# Patient Record
Sex: MALE | Race: WHITE | NOT HISPANIC OR LATINO
[De-identification: names, ages, dates, MRNs, and addresses within clinical notes are randomized per-mention and may not be internally consistent; named-entity substitution may affect disease eponyms.]

---

## 2017-12-01 ENCOUNTER — TRANSCRIPTION ENCOUNTER (OUTPATIENT)
Age: 37
End: 2017-12-01

## 2018-08-14 ENCOUNTER — INPATIENT (INPATIENT)
Facility: HOSPITAL | Age: 38
LOS: 2 days | Discharge: HOME | DRG: 76 | End: 2018-08-17
Attending: INTERNAL MEDICINE | Admitting: INTERNAL MEDICINE
Payer: MEDICAID

## 2018-08-14 VITALS
OXYGEN SATURATION: 100 % | SYSTOLIC BLOOD PRESSURE: 108 MMHG | HEIGHT: 68 IN | TEMPERATURE: 98 F | DIASTOLIC BLOOD PRESSURE: 54 MMHG | HEART RATE: 83 BPM | WEIGHT: 195.11 LBS | RESPIRATION RATE: 16 BRPM

## 2018-08-14 DIAGNOSIS — G03.9 MENINGITIS, UNSPECIFIED: ICD-10-CM

## 2018-08-14 DIAGNOSIS — Z29.9 ENCOUNTER FOR PROPHYLACTIC MEASURES, UNSPECIFIED: ICD-10-CM

## 2018-08-14 DIAGNOSIS — Z90.49 ACQUIRED ABSENCE OF OTHER SPECIFIED PARTS OF DIGESTIVE TRACT: Chronic | ICD-10-CM

## 2018-08-14 LAB
ALBUMIN SERPL ELPH-MCNC: 4.7 G/DL — SIGNIFICANT CHANGE UP (ref 3.3–5)
ALP SERPL-CCNC: 50 U/L — SIGNIFICANT CHANGE UP (ref 40–120)
ALT FLD-CCNC: 50 U/L — HIGH (ref 10–45)
ANION GAP SERPL CALC-SCNC: 18 MMOL/L — HIGH (ref 5–17)
APPEARANCE CSF: CLEAR — SIGNIFICANT CHANGE UP
APPEARANCE SPUN FLD: COLORLESS — SIGNIFICANT CHANGE UP
APPEARANCE UR: CLEAR — SIGNIFICANT CHANGE UP
AST SERPL-CCNC: 27 U/L — SIGNIFICANT CHANGE UP (ref 10–40)
BACTERIA # UR AUTO: ABNORMAL /HPF
BASOPHILS # BLD AUTO: 0 K/UL — SIGNIFICANT CHANGE UP (ref 0–0.2)
BASOPHILS NFR BLD AUTO: 0 % — SIGNIFICANT CHANGE UP (ref 0–2)
BILIRUB SERPL-MCNC: 1 MG/DL — SIGNIFICANT CHANGE UP (ref 0.2–1.2)
BILIRUB UR-MCNC: NEGATIVE — SIGNIFICANT CHANGE UP
BUN SERPL-MCNC: 9 MG/DL — SIGNIFICANT CHANGE UP (ref 7–23)
CALCIUM SERPL-MCNC: 10 MG/DL — SIGNIFICANT CHANGE UP (ref 8.4–10.5)
CHLORIDE SERPL-SCNC: 100 MMOL/L — SIGNIFICANT CHANGE UP (ref 96–108)
CO2 SERPL-SCNC: 21 MMOL/L — LOW (ref 22–31)
COLOR CSF: SIGNIFICANT CHANGE UP
COLOR SPEC: YELLOW — SIGNIFICANT CHANGE UP
CREAT SERPL-MCNC: 1.2 MG/DL — SIGNIFICANT CHANGE UP (ref 0.5–1.3)
CSF PCR RESULT: DETECTED
DIFF PNL FLD: NEGATIVE — SIGNIFICANT CHANGE UP
EOSINOPHIL # BLD AUTO: 0 K/UL — SIGNIFICANT CHANGE UP (ref 0–0.5)
EOSINOPHIL # CSF: 1 % — SIGNIFICANT CHANGE UP
EOSINOPHIL NFR BLD AUTO: 0.3 % — SIGNIFICANT CHANGE UP (ref 0–6)
GLUCOSE CSF-MCNC: 60 MG/DL — SIGNIFICANT CHANGE UP (ref 40–70)
GLUCOSE SERPL-MCNC: 107 MG/DL — HIGH (ref 70–99)
GLUCOSE UR QL: NEGATIVE — SIGNIFICANT CHANGE UP
GRAM STN FLD: SIGNIFICANT CHANGE UP
HCT VFR BLD CALC: 43.7 % — SIGNIFICANT CHANGE UP (ref 39–50)
HGB BLD-MCNC: 14.9 G/DL — SIGNIFICANT CHANGE UP (ref 13–17)
KETONES UR-MCNC: ABNORMAL
LEUKOCYTE ESTERASE UR-ACNC: NEGATIVE — SIGNIFICANT CHANGE UP
LYMPHOCYTES # BLD AUTO: 1.4 K/UL — SIGNIFICANT CHANGE UP (ref 1–3.3)
LYMPHOCYTES # BLD AUTO: 12 % — LOW (ref 13–44)
LYMPHOCYTES # CSF: 81 % — HIGH (ref 40–80)
MCHC RBC-ENTMCNC: 29.2 PG — SIGNIFICANT CHANGE UP (ref 27–34)
MCHC RBC-ENTMCNC: 34.1 GM/DL — SIGNIFICANT CHANGE UP (ref 32–36)
MCV RBC AUTO: 85.6 FL — SIGNIFICANT CHANGE UP (ref 80–100)
MONOCYTES # BLD AUTO: 0.5 K/UL — SIGNIFICANT CHANGE UP (ref 0–0.9)
MONOCYTES NFR BLD AUTO: 4.4 % — SIGNIFICANT CHANGE UP (ref 2–14)
MONOS+MACROS NFR CSF: 16 % — SIGNIFICANT CHANGE UP (ref 15–45)
NEUTROPHILS # BLD AUTO: 9.4 K/UL — HIGH (ref 1.8–7.4)
NEUTROPHILS # CSF: 2 % — SIGNIFICANT CHANGE UP (ref 0–6)
NEUTROPHILS NFR BLD AUTO: 83.3 % — HIGH (ref 43–77)
NITRITE UR-MCNC: NEGATIVE — SIGNIFICANT CHANGE UP
NRBC NFR CSF: 300 /UL — HIGH (ref 0–5)
PH UR: 7.5 — SIGNIFICANT CHANGE UP (ref 5–8)
PLATELET # BLD AUTO: 255 K/UL — SIGNIFICANT CHANGE UP (ref 150–400)
POTASSIUM SERPL-MCNC: 3.9 MMOL/L — SIGNIFICANT CHANGE UP (ref 3.5–5.3)
POTASSIUM SERPL-SCNC: 3.9 MMOL/L — SIGNIFICANT CHANGE UP (ref 3.5–5.3)
PROT CSF-MCNC: 92 MG/DL — HIGH (ref 15–45)
PROT SERPL-MCNC: 8.3 G/DL — SIGNIFICANT CHANGE UP (ref 6–8.3)
PROT UR-MCNC: SIGNIFICANT CHANGE UP
RBC # BLD: 5.11 M/UL — SIGNIFICANT CHANGE UP (ref 4.2–5.8)
RBC # CSF: 37 /UL — HIGH (ref 0–0)
RBC # FLD: 12.4 % — SIGNIFICANT CHANGE UP (ref 10.3–14.5)
RBC CASTS # UR COMP ASSIST: SIGNIFICANT CHANGE UP /HPF (ref 0–2)
SODIUM SERPL-SCNC: 139 MMOL/L — SIGNIFICANT CHANGE UP (ref 135–145)
SP GR SPEC: 1.01 — SIGNIFICANT CHANGE UP (ref 1.01–1.02)
SPECIMEN SOURCE: SIGNIFICANT CHANGE UP
TUBE TYPE: SIGNIFICANT CHANGE UP
UROBILINOGEN FLD QL: NEGATIVE — SIGNIFICANT CHANGE UP
VZV DNA CSF QL NAA+PROBE: DETECTED
WBC # BLD: 11.3 K/UL — HIGH (ref 3.8–10.5)
WBC # FLD AUTO: 11.3 K/UL — HIGH (ref 3.8–10.5)
WBC UR QL: SIGNIFICANT CHANGE UP /HPF (ref 0–5)

## 2018-08-14 PROCEDURE — 93010 ELECTROCARDIOGRAM REPORT: CPT

## 2018-08-14 PROCEDURE — 99232 SBSQ HOSP IP/OBS MODERATE 35: CPT

## 2018-08-14 PROCEDURE — 70450 CT HEAD/BRAIN W/O DYE: CPT | Mod: 26

## 2018-08-14 PROCEDURE — 99285 EMERGENCY DEPT VISIT HI MDM: CPT | Mod: 25

## 2018-08-14 PROCEDURE — 99223 1ST HOSP IP/OBS HIGH 75: CPT

## 2018-08-14 PROCEDURE — 62270 DX LMBR SPI PNXR: CPT

## 2018-08-14 RX ORDER — ONDANSETRON 8 MG/1
4 TABLET, FILM COATED ORAL ONCE
Qty: 0 | Refills: 0 | Status: COMPLETED | OUTPATIENT
Start: 2018-08-14 | End: 2018-08-14

## 2018-08-14 RX ORDER — CEFTRIAXONE 500 MG/1
2 INJECTION, POWDER, FOR SOLUTION INTRAMUSCULAR; INTRAVENOUS EVERY 12 HOURS
Qty: 0 | Refills: 0 | Status: DISCONTINUED | OUTPATIENT
Start: 2018-08-14 | End: 2018-08-15

## 2018-08-14 RX ORDER — ACYCLOVIR SODIUM 500 MG
VIAL (EA) INTRAVENOUS
Qty: 0 | Refills: 0 | Status: DISCONTINUED | OUTPATIENT
Start: 2018-08-14 | End: 2018-08-17

## 2018-08-14 RX ORDER — ACYCLOVIR SODIUM 500 MG
900 VIAL (EA) INTRAVENOUS EVERY 8 HOURS
Qty: 0 | Refills: 0 | Status: DISCONTINUED | OUTPATIENT
Start: 2018-08-14 | End: 2018-08-15

## 2018-08-14 RX ORDER — DIPHENHYDRAMINE HCL 50 MG
50 CAPSULE ORAL ONCE
Qty: 0 | Refills: 0 | Status: COMPLETED | OUTPATIENT
Start: 2018-08-14 | End: 2018-08-14

## 2018-08-14 RX ORDER — ACYCLOVIR SODIUM 500 MG
900 VIAL (EA) INTRAVENOUS EVERY 8 HOURS
Qty: 0 | Refills: 0 | Status: DISCONTINUED | OUTPATIENT
Start: 2018-08-15 | End: 2018-08-17

## 2018-08-14 RX ORDER — ONDANSETRON 8 MG/1
4 TABLET, FILM COATED ORAL EVERY 6 HOURS
Qty: 0 | Refills: 0 | Status: DISCONTINUED | OUTPATIENT
Start: 2018-08-14 | End: 2018-08-17

## 2018-08-14 RX ORDER — CETIRIZINE HYDROCHLORIDE 10 MG/1
0 TABLET ORAL
Qty: 0 | Refills: 0 | COMMUNITY

## 2018-08-14 RX ORDER — VANCOMYCIN HCL 1 G
1000 VIAL (EA) INTRAVENOUS ONCE
Qty: 0 | Refills: 0 | Status: COMPLETED | OUTPATIENT
Start: 2018-08-14 | End: 2018-08-14

## 2018-08-14 RX ORDER — SODIUM CHLORIDE 9 MG/ML
1000 INJECTION INTRAMUSCULAR; INTRAVENOUS; SUBCUTANEOUS
Qty: 0 | Refills: 0 | Status: DISCONTINUED | OUTPATIENT
Start: 2018-08-14 | End: 2018-08-17

## 2018-08-14 RX ORDER — METOCLOPRAMIDE HCL 10 MG
10 TABLET ORAL ONCE
Qty: 0 | Refills: 0 | Status: COMPLETED | OUTPATIENT
Start: 2018-08-14 | End: 2018-08-14

## 2018-08-14 RX ORDER — CEFTRIAXONE 500 MG/1
2 INJECTION, POWDER, FOR SOLUTION INTRAMUSCULAR; INTRAVENOUS ONCE
Qty: 0 | Refills: 0 | Status: COMPLETED | OUTPATIENT
Start: 2018-08-14 | End: 2018-08-14

## 2018-08-14 RX ORDER — VANCOMYCIN HCL 1 G
1250 VIAL (EA) INTRAVENOUS EVERY 12 HOURS
Qty: 0 | Refills: 0 | Status: DISCONTINUED | OUTPATIENT
Start: 2018-08-14 | End: 2018-08-15

## 2018-08-14 RX ORDER — SODIUM CHLORIDE 9 MG/ML
1000 INJECTION INTRAMUSCULAR; INTRAVENOUS; SUBCUTANEOUS ONCE
Qty: 0 | Refills: 0 | Status: COMPLETED | OUTPATIENT
Start: 2018-08-14 | End: 2018-08-14

## 2018-08-14 RX ORDER — CEFTRIAXONE 500 MG/1
2000 INJECTION, POWDER, FOR SOLUTION INTRAMUSCULAR; INTRAVENOUS EVERY 12 HOURS
Qty: 0 | Refills: 0 | Status: DISCONTINUED | OUTPATIENT
Start: 2018-08-14 | End: 2018-08-14

## 2018-08-14 RX ORDER — ACETAMINOPHEN 500 MG
975 TABLET ORAL ONCE
Qty: 0 | Refills: 0 | Status: COMPLETED | OUTPATIENT
Start: 2018-08-14 | End: 2018-08-14

## 2018-08-14 RX ORDER — ACYCLOVIR SODIUM 500 MG
900 VIAL (EA) INTRAVENOUS ONCE
Qty: 0 | Refills: 0 | Status: COMPLETED | OUTPATIENT
Start: 2018-08-14 | End: 2018-08-14

## 2018-08-14 RX ADMIN — Medication 50 MILLIGRAM(S): at 15:06

## 2018-08-14 RX ADMIN — Medication 10 MILLIGRAM(S): at 15:06

## 2018-08-14 RX ADMIN — Medication 975 MILLIGRAM(S): at 15:06

## 2018-08-14 RX ADMIN — CEFTRIAXONE 2 GRAM(S): 500 INJECTION, POWDER, FOR SOLUTION INTRAMUSCULAR; INTRAVENOUS at 16:54

## 2018-08-14 RX ADMIN — Medication 168 MILLIGRAM(S): at 18:07

## 2018-08-14 RX ADMIN — ONDANSETRON 4 MILLIGRAM(S): 8 TABLET, FILM COATED ORAL at 13:51

## 2018-08-14 RX ADMIN — SODIUM CHLORIDE 1000 MILLILITER(S): 9 INJECTION INTRAMUSCULAR; INTRAVENOUS; SUBCUTANEOUS at 14:30

## 2018-08-14 RX ADMIN — Medication 1000 MILLIGRAM(S): at 18:00

## 2018-08-14 RX ADMIN — SODIUM CHLORIDE 3000 MILLILITER(S): 9 INJECTION INTRAMUSCULAR; INTRAVENOUS; SUBCUTANEOUS at 14:07

## 2018-08-14 RX ADMIN — Medication 250 MILLIGRAM(S): at 16:59

## 2018-08-14 RX ADMIN — CEFTRIAXONE 100 GRAM(S): 500 INJECTION, POWDER, FOR SOLUTION INTRAMUSCULAR; INTRAVENOUS at 16:21

## 2018-08-14 NOTE — ED PROVIDER NOTE - NS ED ROS FT
AS PER HPI, otherwise:  Constitutional: see HPI  Eyes: no conjunctivitis, no vision changes  Ears: no ear pain   Nose: no nasal congestion, Mouth/Throat: no throat pain, Neck: (+) stiffness  Cardiovascular: no chest pain, no palpitations  Chest: no cough, no shortness of breath  Gastrointestinal: see HPI  MSK: see hpi  : see hpi  Skin: no rash  Neuro: no LOC, no focal weakness, no sensory changes

## 2018-08-14 NOTE — ED ADULT NURSE NOTE - NSIMPLEMENTINTERV_GEN_ALL_ED
Implemented All Universal Safety Interventions:  [place] to call system. Call bell, personal items and telephone within reach. Instruct patient to call for assistance. Room bathroom lighting operational. Non-slip footwear when patient is off stretcher. Physically safe environment: no spills, clutter or unnecessary equipment. Stretcher in lowest position, wheels locked, appropriate side rails in place.

## 2018-08-14 NOTE — H&P ADULT - NSHPPHYSICALEXAM_GEN_ALL_CORE
PHYSICAL EXAM:  Vital Signs Last 24 Hrs  T(C): 37.2 (08-14-18 @ 19:34)  T(F): 99 (08-14-18 @ 19:34), Max: 102.4 (08-14-18 @ 14:00)  HR: 77 (08-14-18 @ 19:34) (67 - 92)  BP: 105/46 (08-14-18 @ 19:34)  BP(mean): --  RR: 18 (08-14-18 @ 19:34) (16 - 22)  SpO2: 96% (08-14-18 @ 19:34) (96% - 100%)  Wt(kg): --    Constitutional: NAD, awake and alert  EYES: EOMI  ENT:  Normal Hearing, no tonsillar exudates   Neck: Limited range of motion due to pain  Lungs: Breath sounds are clear bilaterally, No wheezing, rales or rhonchi  Heart: S1 and S2, regular rate and rhythm, no Murmurs, gallops or rubs  Abdomen: Bowel Sounds present, soft, nontender, nondistended, no guarding, no rebound  Extremities: No cyanosis or clubbing; warm to touch  Vascular: 2+ peripheral pulses lower ex  Neurological: A/O x 3, no focal deficits; negative brudinzki and negative kernig  Musculoskeletal: 5/5 strength b/l upper and lower extremities  Skin: No rashes  Psych: no depression or anhedonia  HEME: no bruises, no nose bleeds

## 2018-08-14 NOTE — ED PROVIDER NOTE - PROGRESS NOTE DETAILS
Julián Gleason DO PGY1 - due to concern of meningitis, suspicions increased with new rectal temp of 102.4, will consent for LP Julián Gleason DO PGY1 - Pt consented for LP, will await CT before LP Dr. Birch (Attending Physician)  Signed out to me. CSF with >300 wbc's, protein 92, normal glucose.  Likely viral meningitis but my bacterial meningitis score patient is at risk for bacterial meningitis.  Will send csf lactate. Will Cover with acyclovir and admit.

## 2018-08-14 NOTE — ED PROVIDER NOTE - ATTENDING CONTRIBUTION TO CARE
Dr. Hussein : I have personally seen and examined this patient at the bedside. I have fully participated in the care of this patient. I have reviewed all pertinent clinical information, including history, physical exam, plan and the Resident's note and agree except as noted.     38yo M no pmx pw new onset ha since monday. notes that woke up with it and then it gradually gotten worse. +phonophobia no photophobia. + neck stiffness/pain since monday worse when bends his neck. pt also states that felt a little tired on saturday after a quick run at the gym, a little spinning sensation at that time but got home ate and felt fine on satuday night and then all day sunday. this morning woke up with more ha chills nausea/vomitingx 2; followed by epigastric discomfort after vomiting. notes neck pain is going down to his spine. no hx of ha like this before. +dysuria burning sensation in the ed. no urinary or fecal incontinence. as per wife notes that pt is usually more sharp when answering questions. notes mild diarrhea today.  Denies f/cp/sob/palpitations/cough//c/dysuria/hematuria. no sick contacts/recent travel. no hx of meningitis. or trauma/    PE:  head; atraumatic normocephalic  neck: stiffness; painful to flex  eyes: perrla  Heart: rrr s1s2  lungs: ctab  abd: soft, mild epigastric discomfort neg murphys neg mcburneys nd + bs no rebound/guarding no cva ttp  le: no swelling no calf ttp  back: no midline cervical/thoracic/lumbar ttp  neuro: aaox 3 cn ii-xii intact normal finger to nose     -->concern for meningitis bacterial vs viral; will fu ct head labs abx; LP; analgesia--reassess

## 2018-08-14 NOTE — H&P ADULT - NSHPREVIEWOFSYSTEMS_GEN_ALL_CORE
CONSTITUTIONAL: No weakness, fevers or chills  EYES/ENT: No visual changes;  No dysphagia  NECK: No pain or stiffness  RESPIRATORY: No cough, wheezing, hemoptysis; No shortness of breath  CARDIOVASCULAR: No chest pain or palpitations; No lower extremity edema  EXTREMITIES: no le edema, cyanosis, clubbing  MUSCULOSKELETAL: no joint pain, swelling  GASTROINTESTINAL: No abdominal or epigastric pain. No nausea, vomiting, or hematemesis; No diarrhea or constipation. No melena or hematochezia.  BACK: No back pain  GENITOURINARY: No dysuria, frequency or hematuria  NEUROLOGICAL: No numbness or weakness  SKIN: No itching, burning, rashes, or lesions   PSYCH: no agitation  All other review of systems is negative unless indicated above. CONSTITUTIONAL: generalized weakness  EYES/ENT: No visual changes;  No dysphagia  NECK: +neck stiffness  RESPIRATORY: No cough, wheezing, hemoptysis; No shortness of breath  CARDIOVASCULAR: No chest pain or palpitations; No lower extremity edema  EXTREMITIES: no le edema, cyanosis, clubbing  MUSCULOSKELETAL: no joint pain, swelling  GASTROINTESTINAL: No abdominal or epigastric pain. +nausea/vomiting, or hematemesis; +diarrhea  BACK: No back pain  GENITOURINARY: No dysuria, frequency or hematuria  NEUROLOGICAL: some numbness and tingling of lower extremities  SKIN: No itching, burning, rashes, or lesions   PSYCH: no agitation  All other review of systems is negative unless indicated above.

## 2018-08-14 NOTE — H&P ADULT - HISTORY OF PRESENT ILLNESS
38 yo male with no PMH presents here with headache.  Patient states that on Saturday, he was at the gym and started having lightheadedness and felt weak.  On Sunday, he felt OK.  Monday, he woke up with severe headache diffusely.  He could not get out of bed all day.  Last night, patient started having nausea and vomiting several episodes.  He did not have any fever.  He also started having bodyache, joint pain.  Denies any sick contact, recent travel, runny nose, cough, SOB.  He had 2 episodes of diarrhea yesterday and one episode of diarrhea today.  After coming to ED, he started having neck stiffness and a rectal temp of 102.4.  He had phonophobia but no photophobia.  He denies any other complaints. 36 yo male with PMH ?Asthma, presents here with headache.  Patient states that on Saturday, he was at the gym and started having lightheadedness and felt weak.  On Sunday, he felt OK.  Monday, he woke up with severe headache diffusely.  He could not get out of bed all day.  Last night, patient started having nausea and vomiting several episodes.  He did not have any fever.  He also started having bodyache, joint pain.  Denies any sick contact, recent travel, runny nose, cough, SOB.  He had 2 episodes of diarrhea yesterday and one episode of diarrhea today.  After coming to ED, he started having neck stiffness and a rectal temp of 102.4.  He had phonophobia but no photophobia.  He denies any other complaints.

## 2018-08-14 NOTE — H&P ADULT - PROBLEM SELECTOR PLAN 1
Patient with headache, nausea, vomiting, phonophobia, neck stiffness, fever likely due to viral meningitis; can't rule out bacterial meningitis  s/p IV vancomycin, ceftriaxone and acyclovir  will continue same for now  CSF shows normal glucose and high protein;   CT head negative  c/w neuro checks  no focal neurological findings at this time  will get neurology consult in AM  ID consult in AM  f/u blood culture and CSF culture  HIV test Patient with headache, nausea, vomiting, phonophobia, neck stiffness, fever likely due to viral meningitis; can't rule out bacterial meningitis  s/p IV vancomycin, ceftriaxone and acyclovir  will continue same for now  CSF shows normal glucose and high protein;   CT head negative  c/w neuro checks  no focal neurological findings at this time  will get neurology consult in AM  ID consult in AM  f/u blood culture and CSF culture  HIV test  IVF  IV Zofran

## 2018-08-14 NOTE — ED PROVIDER NOTE - PHYSICAL EXAMINATION
Gen: Well appearing, NAD  Head: NCAT  HEENT: PERRL, MMM, normal conjunctiva, anicteric, (+) limited ROM of neck due to severe pain, immediately brings neck back to position of most comfort  Lung: CTAB, no adventitious sounds  CV: RRR, no murmurs  Abd: mildly tender epigastrum, soft, nondistended  MSK: mildly TTP in b/l legs  Neuro: CN II-XII grossly intact. 5/5 strength and normal sensation in all extremities. Ambulatory with assistance due to myalgia/generalized weakness  Skin: Warm and dry, no evidence of rash  Psych: normal mood and affect

## 2018-08-14 NOTE — H&P ADULT - NSHPLABSRESULTS_GEN_ALL_CORE
Labs personally reviewed:                          14.9   11.3  )-----------( 255      ( 14 Aug 2018 13:06 )             43.7     08-14    139  |  100  |  9   ----------------------------<  107<H>  3.9   |  21<L>  |  1.20    Ca    10.0      14 Aug 2018 13:06    TPro  8.3  /  Alb  4.7  /  TBili  1.0  /  DBili  x   /  AST  27  /  ALT  50<H>  /  AlkPhos  50  14    CARDIAC MARKERS ( 14 Aug 2018 13:06 )  x     / x     / 128 U/L / x     / x          LIVER FUNCTIONS - ( 14 Aug 2018 13:06 )  Alb: 4.7 g/dL / Pro: 8.3 g/dL / ALK PHOS: 50 U/L / ALT: 50 U/L / AST: 27 U/L / GGT: x             Urinalysis Basic - ( 14 Aug 2018 18:34 )    Color: Yellow / Appearance: Clear / S.015 / pH: x  Gluc: x / Ketone: Trace  / Bili: Negative / Urobili: Negative   Blood: x / Protein: Trace / Nitrite: Negative   Leuk Esterase: Negative / RBC: 0-2 /HPF / WBC 0-2 /HPF   Sq Epi: x / Non Sq Epi: x / Bacteria: Few /HPF      CAPILLARY BLOOD GLUCOSE          Imaging:  CT scan of brain negative    EKG done in ED could not be found; will reorder

## 2018-08-14 NOTE — ED ADULT NURSE NOTE - OBJECTIVE STATEMENT
37y m pt with wife c/o nausea, vomiting and headache since yesterday; pt has had a little water and a pretzel this am; vomit is mostly "mucusy" liquid; pt denies fever but has been having chills; pt denies eating anything out of ordinary and denies sick contact; pt very anxious and dramatic; aox3; no resp distress; no chest pain; abd soft nondistended nontender; pt denies diarrhea/constipation; no dysuria/hematuria; no blood in stool; iv placed; labs drawn; pt medicated per md order; safety/comfort maintained

## 2018-08-14 NOTE — ED PROVIDER NOTE - MEDICAL DECISION MAKING DETAILS
36yo M new onset headache associated with 38yo M new onset severe worsening headache with myalgias, neck/spine pain, nausea, vomiting. No focal neuro deficits but pain with very limited ROM of neck, not supple Must r/o meningitis. Will get CT to r/o other intracranial causes, and LP.

## 2018-08-14 NOTE — H&P ADULT - ASSESSMENT
38 yo male with PMH ?Asthma, presents here with headache likely due to viral meningitis, can't r/o bacterial meningitis

## 2018-08-14 NOTE — ED PROVIDER NOTE - CARE PLAN
Principal Discharge DX:	Nonintractable episodic headache, unspecified headache type Principal Discharge DX:	Meningitis

## 2018-08-14 NOTE — ED PROVIDER NOTE - OBJECTIVE STATEMENT
36 yo male no pmx presents with new onset severe headache that he woke up with monday. Pt states it was preceded by several days of lightheadedness, nausea, NBNB vomiting, that resolved sunday but has since returned with 2x episode of watery diarrhea. Pt currently complains of the headache, nausea, myalgias in his legs, pain down his neck and spine. Pt has never had a headache like this before. States dysuria when he came to the ED. No fever, +chills, chest pain, sob, dyusria. No hx of trauma, international travel. 38 yo male no pmx presents with new onset severe headache that he woke up with monday. Pt currently complains of the headache, nausea, myalgias in his legs, pain down his neck and spine. Pt has never had a headache like this before. States dysuria when he came to the ED. No fever, +chills, chest pain, sob, dyusria. No hx of trauma, international travel.

## 2018-08-15 LAB
ALBUMIN SERPL ELPH-MCNC: 3.5 G/DL — SIGNIFICANT CHANGE UP (ref 3.3–5)
ALP SERPL-CCNC: 36 U/L — LOW (ref 40–120)
ALT FLD-CCNC: 33 U/L — SIGNIFICANT CHANGE UP (ref 10–45)
ANION GAP SERPL CALC-SCNC: 11 MMOL/L — SIGNIFICANT CHANGE UP (ref 5–17)
AST SERPL-CCNC: 21 U/L — SIGNIFICANT CHANGE UP (ref 10–40)
BASOPHILS # BLD AUTO: 0.02 K/UL — SIGNIFICANT CHANGE UP (ref 0–0.2)
BASOPHILS NFR BLD AUTO: 0.2 % — SIGNIFICANT CHANGE UP (ref 0–2)
BILIRUB SERPL-MCNC: 0.8 MG/DL — SIGNIFICANT CHANGE UP (ref 0.2–1.2)
BUN SERPL-MCNC: 9 MG/DL — SIGNIFICANT CHANGE UP (ref 7–23)
CALCIUM SERPL-MCNC: 8.3 MG/DL — LOW (ref 8.4–10.5)
CHLORIDE SERPL-SCNC: 104 MMOL/L — SIGNIFICANT CHANGE UP (ref 96–108)
CO2 SERPL-SCNC: 24 MMOL/L — SIGNIFICANT CHANGE UP (ref 22–31)
CREAT SERPL-MCNC: 1.15 MG/DL — SIGNIFICANT CHANGE UP (ref 0.5–1.3)
CULTURE RESULTS: NO GROWTH — SIGNIFICANT CHANGE UP
EOSINOPHIL # BLD AUTO: 0.02 K/UL — SIGNIFICANT CHANGE UP (ref 0–0.5)
EOSINOPHIL NFR BLD AUTO: 0.2 % — SIGNIFICANT CHANGE UP (ref 0–6)
GLUCOSE SERPL-MCNC: 109 MG/DL — HIGH (ref 70–99)
HCT VFR BLD CALC: 37.9 % — LOW (ref 39–50)
HGB BLD-MCNC: 12.6 G/DL — LOW (ref 13–17)
HIV 1+2 AB+HIV1 P24 AG SERPL QL IA: SIGNIFICANT CHANGE UP
IMM GRANULOCYTES NFR BLD AUTO: 0.1 % — SIGNIFICANT CHANGE UP (ref 0–1.5)
LYMPHOCYTES # BLD AUTO: 2.74 K/UL — SIGNIFICANT CHANGE UP (ref 1–3.3)
LYMPHOCYTES # BLD AUTO: 32.7 % — SIGNIFICANT CHANGE UP (ref 13–44)
MAGNESIUM SERPL-MCNC: 1.9 MG/DL — SIGNIFICANT CHANGE UP (ref 1.6–2.6)
MCHC RBC-ENTMCNC: 28.5 PG — SIGNIFICANT CHANGE UP (ref 27–34)
MCHC RBC-ENTMCNC: 33.2 GM/DL — SIGNIFICANT CHANGE UP (ref 32–36)
MCV RBC AUTO: 85.7 FL — SIGNIFICANT CHANGE UP (ref 80–100)
MONOCYTES # BLD AUTO: 0.85 K/UL — SIGNIFICANT CHANGE UP (ref 0–0.9)
MONOCYTES NFR BLD AUTO: 10.1 % — SIGNIFICANT CHANGE UP (ref 2–14)
NEUTROPHILS # BLD AUTO: 4.74 K/UL — SIGNIFICANT CHANGE UP (ref 1.8–7.4)
NEUTROPHILS NFR BLD AUTO: 56.7 % — SIGNIFICANT CHANGE UP (ref 43–77)
PHOSPHATE SERPL-MCNC: 2.5 MG/DL — SIGNIFICANT CHANGE UP (ref 2.5–4.5)
PLATELET # BLD AUTO: 192 K/UL — SIGNIFICANT CHANGE UP (ref 150–400)
POTASSIUM SERPL-MCNC: 3.8 MMOL/L — SIGNIFICANT CHANGE UP (ref 3.5–5.3)
POTASSIUM SERPL-SCNC: 3.8 MMOL/L — SIGNIFICANT CHANGE UP (ref 3.5–5.3)
PROT SERPL-MCNC: 6.3 G/DL — SIGNIFICANT CHANGE UP (ref 6–8.3)
RAPID RVP RESULT: SIGNIFICANT CHANGE UP
RBC # BLD: 4.42 M/UL — SIGNIFICANT CHANGE UP (ref 4.2–5.8)
RBC # FLD: 12.7 % — SIGNIFICANT CHANGE UP (ref 10.3–14.5)
SODIUM SERPL-SCNC: 139 MMOL/L — SIGNIFICANT CHANGE UP (ref 135–145)
SPECIMEN SOURCE: SIGNIFICANT CHANGE UP
WBC # BLD: 8.38 K/UL — SIGNIFICANT CHANGE UP (ref 3.8–10.5)
WBC # FLD AUTO: 8.38 K/UL — SIGNIFICANT CHANGE UP (ref 3.8–10.5)

## 2018-08-15 PROCEDURE — 99232 SBSQ HOSP IP/OBS MODERATE 35: CPT

## 2018-08-15 PROCEDURE — 95819 EEG AWAKE AND ASLEEP: CPT | Mod: 26

## 2018-08-15 RX ORDER — ACETAMINOPHEN 500 MG
650 TABLET ORAL EVERY 6 HOURS
Qty: 0 | Refills: 0 | Status: DISCONTINUED | OUTPATIENT
Start: 2018-08-15 | End: 2018-08-15

## 2018-08-15 RX ORDER — ACETAMINOPHEN 500 MG
650 TABLET ORAL EVERY 6 HOURS
Qty: 0 | Refills: 0 | Status: DISCONTINUED | OUTPATIENT
Start: 2018-08-15 | End: 2018-08-17

## 2018-08-15 RX ORDER — METOCLOPRAMIDE HCL 10 MG
10 TABLET ORAL ONCE
Qty: 0 | Refills: 0 | Status: COMPLETED | OUTPATIENT
Start: 2018-08-15 | End: 2018-08-15

## 2018-08-15 RX ORDER — ACETAMINOPHEN 500 MG
1000 TABLET ORAL ONCE
Qty: 0 | Refills: 0 | Status: COMPLETED | OUTPATIENT
Start: 2018-08-15 | End: 2018-08-15

## 2018-08-15 RX ADMIN — Medication 650 MILLIGRAM(S): at 12:49

## 2018-08-15 RX ADMIN — SODIUM CHLORIDE 75 MILLILITER(S): 9 INJECTION INTRAMUSCULAR; INTRAVENOUS; SUBCUTANEOUS at 03:59

## 2018-08-15 RX ADMIN — Medication 10 MILLIGRAM(S): at 21:31

## 2018-08-15 RX ADMIN — ONDANSETRON 4 MILLIGRAM(S): 8 TABLET, FILM COATED ORAL at 17:46

## 2018-08-15 RX ADMIN — Medication 650 MILLIGRAM(S): at 23:30

## 2018-08-15 RX ADMIN — Medication 268 MILLIGRAM(S): at 21:31

## 2018-08-15 RX ADMIN — Medication 168 MILLIGRAM(S): at 03:09

## 2018-08-15 RX ADMIN — Medication 268 MILLIGRAM(S): at 12:48

## 2018-08-15 RX ADMIN — Medication 650 MILLIGRAM(S): at 17:24

## 2018-08-15 RX ADMIN — Medication 650 MILLIGRAM(S): at 23:00

## 2018-08-15 RX ADMIN — Medication 650 MILLIGRAM(S): at 13:09

## 2018-08-15 RX ADMIN — Medication 400 MILLIGRAM(S): at 00:29

## 2018-08-15 NOTE — CONSULT NOTE ADULT - ASSESSMENT
37 yr old with HA, fever, no confusion, has normal glucose and lymphocytes in spinal fluid  no epidemiologic exposures   non toxic   appearance is most consistent with viral meningitis and I suspect that the PCR for varicella is a false positive . He has no rash or recent zoster.  I suspect enterovirus and not varicella  ( there are false positives)    await MRI and then we can consider stopping the acyclovir is normal

## 2018-08-15 NOTE — CONSULT NOTE ADULT - ASSESSMENT
37 year old man presents with a viral meningitis.  There is no evidence of rash or vesicular eruption.  After discussion with infectious disease there is concern for falsely positive VZV PCR.  Would recommend MRI Brain to assess for T2 signal changes/hemorrhage in mesial temporal lobes as well as routine EEG to assess for frontotemporal spike wave discharges or PLEDs.    Continue with Acyclovir until above tests complete.

## 2018-08-15 NOTE — CONSULT NOTE ADULT - SUBJECTIVE AND OBJECTIVE BOX
Neurology Consult    Patient is a 37y old  Male who presents with a chief complaint of headache (14 Aug 2018 20:58)      HPI:  36 yo male with PMH ?Asthma, presents here with headache.  Patient states that on Saturday, he was at the gym and started having lightheadedness and felt weak.  Patient was evaluated in ED, lumbar puncture preformed.   Elevated CSF Protein and leukocyte count.  PCR + for varicella zoster.  Patient denies confusion, seizure, or abnormal movements.      On , he felt OK.  Monday, he woke up with severe headache diffusely.  He could not get out of bed all day.  Last night, patient started having nausea and vomiting several episodes.  He did not have any fever.  He also started having bodyache, joint pain.  Denies any sick contact, recent travel, runny nose, cough, SOB.  He had 2 episodes of diarrhea yesterday and one episode of diarrhea today.  After coming to ED, he started having neck stiffness and a rectal temp of 102.4.  He had phonophobia but no photophobia.  He denies any other complaints. (14 Aug 2018 20:58)      PAST MEDICAL & SURGICAL HISTORY:  Asthma  History of appendectomy  H/O knee surgery: left meniscus surgery      Allergies    No Known Allergies    Intolerances        MEDICATIONS  (STANDING):  acetaminophen   Tablet. 650 milliGRAM(s) Oral every 6 hours  acyclovir IVPB      acyclovir IVPB 900 milliGRAM(s) IV Intermittent every 8 hours  sodium chloride 0.9%. 1000 milliLiter(s) (75 mL/Hr) IV Continuous <Continuous>    MEDICATIONS  (PRN):  ondansetron Injectable 4 milliGRAM(s) IV Push every 6 hours PRN Nausea      Social History: Denies tob, EtOH use     FAMILY HISTORY:  Family history of stomach cancer (Father)      Review of Systems:  CONSTITUTIONAL:  No weight loss, fever, chills, weakness or fatigue.  HEENT:  Eyes:  No visual loss, blurred vision, double vision or yellow sclerae. Ears, Nose, Throat:  No hearing loss, sneezing, congestion, runny nose or sore throat.  SKIN:  No rash or itching.  CARDIOVASCULAR:  No chest pain, chest pressure or chest discomfort. No palpitations or edema.  RESPIRATORY:  No shortness of breath, cough or sputum.  GASTROINTESTINAL:  No anorexia, nausea, vomiting or diarrhea. No abdominal pain or blood.  GENITOURINARY:  Burning on urination. Pregnancy. Last menstrual period, MM/DD/YYYY.  NEUROLOGICAL:  No headache, dizziness, syncope, paralysis, ataxia, numbness or tingling in the extremities. No change in bowel or bladder control.  MUSCULOSKELETAL:  No muscle, back pain, joint pain or stiffness.  HEMATOLOGIC:  No anemia, bleeding or bruising.  LYMPHATICS:  No enlarged nodes. No history of splenectomy.  PSYCHIATRIC:  No history of depression or anxiety.  ENDOCRINOLOGIC:  No reports of sweating, cold or heat intolerance. No polyuria or polydipsia.      Physical Exam:   Vital Signs Last 24 Hrs  T(C): 36.6 (15 Aug 2018 05:48), Max: 39.1 (14 Aug 2018 14:00)  T(F): 97.9 (15 Aug 2018 05:48), Max: 102.4 (14 Aug 2018 14:00)  HR: 81 (15 Aug 2018 05:48) (67 - 92)  BP: 113/75 (15 Aug 2018 05:48) (100/42 - 127/54)  BP(mean): --  RR: 17 (15 Aug 2018 05:48) (16 - 22)  SpO2: 97% (15 Aug 2018 05:48) (96% - 100%)    General Exam:   General appearance: No acute distress                   Neurological Exam:  Mental Status: AAOx3, fluent speech, follows simple commands, able to name    Cranial Nerves: EOMI, PERRL, VFF, V1-V3 intact, facial symmetric, no dysarthria, tongue midline    Motor: No drift in UE  Left UE:  Deltoid 5/5  Bicep 5/5 Tricep 5/5 Wrist Extension 5/5 Wrist Flexion 5/5 Finger Flexion 5/5 Finger Extension 5/5 APB 5/5 Dorsal Interossei 5/5  Right UE:  Deltoid 5/5  Bicep 5/5 Tricep 5/5 Wrist Extension 5/5 Wrist Flexion 5/5 Finger Flexion 5/5 Finger Extension 5/5 APB 5/5 Dorsal Interossei 5/5  Left LE:  Hip Flex 5/5 Hip Add 5/5 Hip Abd 5/5 Knee Ext 5/5 Dorsiflexion 5/5 Eversion 5/5 Inversion 5/5 Plantar Flexion 5/5  Right LE:  Hip Flex 5/5 Hip Add 5/5 Hip Abd 5/5 Knee Ext 5/5 Dorsiflexion 5/5 Eversion 5/5 Inversion 5/5 Plantar Flexion 5/5    Sensation:   Intact to LT throughout  Intact to PP throughout  Intact to Vibration throughout    Coordination: FTN intact b/l    Reflexes: 2+ bilateral biceps, brachioradialis, patellar and ankle       Labs:     CBC Full  -  ( 15 Aug 2018 07:26 )  WBC Count : 8.38 K/uL  Hemoglobin : 12.6 g/dL  Hematocrit : 37.9 %  Platelet Count - Automated : 192 K/uL  Mean Cell Volume : 85.7 fl  Mean Cell Hemoglobin : 28.5 pg  Mean Cell Hemoglobin Concentration : 33.2 gm/dL  Auto Neutrophil # : 4.74 K/uL  Auto Lymphocyte # : 2.74 K/uL  Auto Monocyte # : 0.85 K/uL  Auto Eosinophil # : 0.02 K/uL  Auto Basophil # : 0.02 K/uL  Auto Neutrophil % : 56.7 %  Auto Lymphocyte % : 32.7 %  Auto Monocyte % : 10.1 %  Auto Eosinophil % : 0.2 %  Auto Basophil % : 0.2 %    08-15    139  |  104  |  9   ----------------------------<  109<H>  3.8   |  24  |  1.15    Ca    8.3<L>      15 Aug 2018 06:32  Phos  2.5     08-15  Mg     1.9     08-15    TPro  6.3  /  Alb  3.5  /  TBili  0.8  /  DBili  x   /  AST  21  /  ALT  33  /  AlkPhos  36<L>  08-15    LIVER FUNCTIONS - ( 15 Aug 2018 06:32 )  Alb: 3.5 g/dL / Pro: 6.3 g/dL / ALK PHOS: 36 U/L / ALT: 33 U/L / AST: 21 U/L / GGT: x             Urinalysis Basic - ( 14 Aug 2018 18:34 )    Color: Yellow / Appearance: Clear / S.015 / pH: x  Gluc: x / Ketone: Trace  / Bili: Negative / Urobili: Negative   Blood: x / Protein: Trace / Nitrite: Negative   Leuk Esterase: Negative / RBC: 0-2 /HPF / WBC 0-2 /HPF   Sq Epi: x / Non Sq Epi: x / Bacteria: Few /HPF        Radiology:

## 2018-08-15 NOTE — PROGRESS NOTE ADULT - PROBLEM SELECTOR PLAN 1
pt  c/o  mild  headches  , no  fever ,  viral  cultures  on CSF   positive  for   Varicela Zoster  , presently  on  Acyclovir  , cultures  pending ,ID  consult  requested ,discussed  with  pt  and  wife  present  in the  room .

## 2018-08-15 NOTE — CHART NOTE - NSCHARTNOTEFT_GEN_A_CORE
MEDICINE NP    AMBAR LY  37y Male    Patient is a 37y old  Male who presents with a chief complaint of headache (14 Aug 2018 20:58)  Admitted w/ Meningitis.        > Event Summary:  12AM Notified by RN, Patient with c/o HA.  Temp 98.8 Orally.    Patient seen and examined at bedside w. wife present.   AAOX3, reports HA, chills, neck pain.  Denies visual changes, N/V, paresthesia, chest pain, sob, loss of rom  -Rectal Temp obtained 100.6    > Vital Signs : (15 Aug 2018 00:09)   T(C): 37.1 T(F): 98.8  HR: 73  BP: 102/50   RR: 17 SpO2:  100% R/A    > Review Of System:   General:	+Fevers, chills.    Neurological:  +Headache.  No dizziness.   No weakness.   No numbness/tingling.  No B/B incontinence.   Ophthalmologic:  No visual changes. No eye pain  Respiratory:  No cough. No SOB. No hemoptysis  Cardiovascular:  No chest pain. No palpitations.  Gastrointestinal: No abdominal pain. No Nausea/ vomiting/ diarrhea.  No hematemesis. No melena.    Genitourinary:  No hematuria, dysuria, frequency or burning.   Musculoskeletal:  +Neck pain.    Skin:  No rash. No open lesions.       > Physical Assessment:  General: Awake, No acute distress.   Neurology: A&Ox3.  PERRL 3mm b/l.  +  CV: +S1S2, RRR.  No peripheral edema  Respiratory: Even, unlabored.  CTA B/L.    Abdomen:  +BS.  Soft, NT, ND.  No palpable mass  MSK: MORENO x4.   Skin: Warm and Dry         > Assessment & Plan:  - HPI:     -Plan:               ANDIE Chua-BC  Medicine Department MEDICINE NP    AMBAR LY  37y Male    Patient is a 37y old  Male who presents with a chief complaint of headache (14 Aug 2018 20:58)  Admitted w/ Meningitis.        > Event Summary:  12AM Notified by RN, Patient with c/o HA.  Temp 98.8 Orally.    Patient seen and examined at bedside w. wife present.   AAOX3, reports HA, chills, neck pain.  Denies visual changes, N/V, paresthesia, chest pain, sob, loss of rom  -Rectal Temp obtained 100.6    > Vital Signs : (15 Aug 2018 00:09)   T(C): 37.1 T(F): 98.8  HR: 73  BP: 102/50   RR: 17 SpO2:  100% R/A    > Review Of System:   General:	+Fevers, chills.    Neurological:  +Headache.  No dizziness.   No weakness.   No numbness/tingling.  No B/B incontinence.   Ophthalmologic:  No visual changes. No eye pain  Respiratory:  No cough. No SOB. No hemoptysis  Cardiovascular:  No chest pain. No palpitations.  Gastrointestinal: No abdominal pain. No Nausea/ vomiting/ diarrhea.  No hematemesis. No melena.    Genitourinary:  No hematuria, dysuria, frequency or burning.   Musculoskeletal:  +Neck pain.    Skin:  No rash. No open lesions.     >LABS:  Urinalysis Basic - ( 14 Aug 2018 18:34 )  Color: Yellow / Appearance: Clear / S.015 / pH: x  Gluc: x / Ketone: Trace  / Bili: Negative / Urobili: Negative   Blood: x / Protein: Trace / Nitrite: Negative   Leuk Esterase: Negative / RBC: 0-2 /HPF / WBC 0-2 /HPF   Sq Epi: x / Non Sq Epi: x / Bacteria: Few /HPF        Culture - CSF with Gram Stain . (18 @ 17:46)    Gram Stain: polymorphonuclear leukocytes seen  No organisms seen by cytocentrifuge    Specimen Source: .CSF CSF    Cerebrospinal Fluid Cell Count-1 (18 @ 16:46)    Total Nucleated Cell Count, CSF: 300 /uL    CSF Color: No Color    CSF Appearance: Clear    CSF Lymphocytes: 81 %    CSF Monocytes/Macrophages: 16 %    CSF Segmented Neutrophils: 2 %      >RADIOLOGY:  < from: CT Head No Cont (18 @ 15:24) >  IMPRESSION:   No acute intracranial bleed, mass effect, or shift.   < end of copied text >        > Physical Assessment:  General: Awake, No acute distress.   Neurology: A&Ox3.  PERRL 3mm b/l.    Brudzinski and Kernig negative.  Sensations intact.   CV: +S1S2, RRR.  No peripheral edema  Respiratory: Even, unlabored.  CTA B/L.    Abdomen:  +BS.  Soft, NT, ND.  No palpable mass  MSK: MORENO x4. Full ROM of neck with mild pain.   Skin: Warm and Dry         > Assessment & Plan:  - HPI: 36 yo male with PMH ?Asthma.  Presents here with Headache.  Admitted with likely viral Meningitis, can't r/o bacterial meningitis.  Now with recurrent Headache w/ Fever.    1) Headache w/ Fever:  Likely disease process of Meningitis  -Ofirmev 1G IV x1 now for HA and Fever.   -c/w IV Zovirax, Vancomycin, Rocephin  -c/w Neuro checks and Vitals q4hs  -f/u Neurology Consult  -Consider ID consult in AM by Primary Team  -Attending to follow in AM    Endorsed to floor NP        Yasemin Lino, ANDIE-BC  Medicine Department  #[id number]

## 2018-08-15 NOTE — EEG REPORT - NS EEG TEXT BOX
[facility]   COMPREHENSIVE EPILEPSY CENTER   REPORT OF ROUTINE VIDEO EEG     [facility]: [address] [place] [address], [address], [place] [address], Ph#: [phone]  [facility]: [id number], [place] [address], Ph#: [phone]  Office: [address] [place] [address], [facility] [address], [place] [address] Ph#: [phone]    Patient Name: AMBAR LY  Age and : 37y ([birth year])  MRN #: [id number]  Location: [address] [facility]    Study Date: 8/15/18    _____________________________________________________________  TECHNICAL INFORMATION    EEG Placement and Labeling of Electrodes:  The EEG was performed utilizing 20 channels referential EEG connections (coronal over temporal over parasagittal montage) using all standard 10-20 electrode placements with EKG.  Recording was at a sampling rate of 256 samples per second per channel.  Time synchronized digital video recording was done simultaneously with EEG recording.  A low light infrared camera was used for low light recording.  Sammy and seizure detection algorithms were utilized.    _____________________________________________________________  HISTORY:  Patient is a 37y old  Male who presents with a chief complaint of headache (14 Aug 2018 20:58), possible viral meningitis    MEDICATIONS  (STANDING):  acetaminophen   Tablet. 650 milliGRAM(s) Oral every 6 hours  acyclovir IVPB      acyclovir IVPB 900 milliGRAM(s) IV Intermittent every 8 hours  metoclopramide Injectable 10 milliGRAM(s) IV Push once  sodium chloride 0.9%. 1000 milliLiter(s) (75 mL/Hr) IV Continuous <Continuous>    _____________________________________________________________  STUDY INTERPRETATION    Background:  The background was continuous, spontaneously variable, reactive, and predominantly consisted of alpha activities. Low amplitude frontal beta was noted in wakefulness.  During wakefulness, the posteriorly dominant rhythm consisted of symmetric, well-modulated 10-11 Hz activity, that attenuated to eye opening.      Sleep:  No sleep was captured.    Non-epileptiform Paroxysmal Abnormalities:  None    Interictal Epileptiform Abnormalities:   None    Ictal Abnormalities:   No clinical events.  No electrographic seizures.    Activation Procedures:   Hyperventilation was not performed.    Photic stimulation was not performed.     Artifacts:  Intermittent myogenic and movement artifacts were noted.    ECG:  The heart rate on single channel ECG was predominantly between 50-60 BPM.    _____________________________________________________________  EEG CLASSIFICATION/SUMMARY:    Normal EEG in the awake state.  _____________________________________________________________  CLINICAL IMPRESSION:    Normal EEG study.  No sleep was captured.   No epileptic pattern or seizure seen.    Trey Topete MD PhD  Attending Physician, [facility]ile[facility] [place]

## 2018-08-15 NOTE — CONSULT NOTE ADULT - SUBJECTIVE AND OBJECTIVE BOX
Patient is a 37y old  Male who presents with a chief complaint of headache (14 Aug 2018 20:58)    HPI:  36 yo male with PMH ?Asthma, presents here with headache.  Patient states that on Saturday, he was at the gym and started having lightheadedness and felt weak.  On Sunday, he felt OK.  Monday, he woke up with severe headache diffusely.  He could not get out of bed all day.  Last night, patient started having nausea and vomiting several episodes.  He did not have any fever.  He also started having bodyache, joint pain.  Denies any sick contact, recent travel, runny nose, cough, SOB.  He had 2 episodes of diarrhea yesterday and one episode of diarrhea today.  After coming to ED, he started having neck stiffness and a rectal temp of 102.4.  He had phonophobia but no photophobia.  He denies any other complaints. (14 Aug 2018 20:58)      PAST MEDICAL & SURGICAL HISTORY:  Asthma  History of appendectomy  H/O knee surgery: left meniscus surgery      Social history:    FAMILY HISTORY:  Family history of stomach cancer (Father)    REVIEW OF SYSTEMS  General:	  malaise fatigue or chills. Fevers     Skin:No rash  	  Ophthalmologic:Denies any visual complaints,discharge redness or photophobia  	  ENMT:No nasal discharge,headache,sinus congestion or throat pain.No dental complaints    Respiratory and Thorax:No cough,sputum or chest pain.Denies shortness of breath  	  Cardiovascular:	No chest pain,palpitaions or dizziness    Gastrointestinal:	NO nausea,abdominal pain or diarrhea.    Genitourinary:	No dysuria,frequency. No flank pain    Musculoskeletal:	No joint swelling or pain.No weakness    Neurological:No confusion .No extremity weakness.No bladder or bowel incontinence	    Psychiatric:No delusions or hallucinations	    Hematology/Lymphatics:	No LN swelling.No gum bleeding     Endocrine:	No recent weight gain or loss.No abnormal heat/cold intolerance    Allergic/Immunologic:	No hives or rash   Allergies    No Known Allergies    Intolerances        Antimicrobials:    acyclovir IVPB      acyclovir IVPB 900 milliGRAM(s) IV Intermittent every 8 hours        Vital Signs Last 24 Hrs  T(C): 36.6 (15 Aug 2018 05:48), Max: 39.1 (14 Aug 2018 14:00)  T(F): 97.9 (15 Aug 2018 05:48), Max: 102.4 (14 Aug 2018 14:00)  HR: 81 (15 Aug 2018 05:48) (67 - 92)  BP: 113/75 (15 Aug 2018 05:48) (100/42 - 127/54)  BP(mean): --  RR: 17 (15 Aug 2018 05:48) (16 - 22)  SpO2: 97% (15 Aug 2018 05:48) (96% - 100%)    PHYSICAL EXAM:Pleasant patient in no acute distress.      Constitutional:Comfortable.Awake and alert  No cachexia     Eyes:PERRL EOMI.NO discharge or conjunctival injection    ENMT:No sinus tenderness.No thrush.No pharyngeal exudate or erythema.Fair dental hygiene    Neck:Supple,No LN,no JVD      Respiratory:Good air entry bilaterally,CTA    Cardiovascular:S1 S2 wnl, No murmurs,rub or gallops    Gastrointestinal:Soft BS(+) no tenderness no masses ,No rebound or guarding    Genitourinary:No CVA tendereness     Rectal:    Extremities:No cyanosis,clubbing or edema.    Vascular:peripheral pulses felt    Neurological:AAO X 3,No grossly focal deficits    Skin:No rash     Lymph Nodes:No palpable LNs    Musculoskeletal:No joint swelling or LOM    Psychiatric:Affect normal.                                12.6   8.38  )-----------( 192      ( 15 Aug 2018 07:26 )             37.9         08-15    139  |  104  |  9   ----------------------------<  109<H>  3.8   |  24  |  1.15    Ca    8.3<L>      15 Aug 2018 06:32  Phos  2.5     08-15  Mg     1.9     08-15    TPro  6.3  /  Alb  3.5  /  TBili  0.8  /  DBili  x   /  AST  21  /  ALT  33  /  AlkPhos  36<L>  08-15      RECENT CULTURES:  08-14 @ 17:46  .CSF CSF  --  --  --  --  --      MICROBIOLOGY:      CSF Lymphocytes: 81 % (08-14 @ 16:46)  CSF Segmented Neutrophils: 2 % (08-14 @ 16:46)  Glucose, CSF: 60 mg/dL (08-14 @ 16:46)  Protein, CSF: 92 mg/dL (08-14 @ 16:46)  CSF Segmented Neutrophils: 1 % (08-14 @ 16:38)  CSF Lymphocytes: 83 % (08-14 @ 16:38)      Radiology:      Assessment:        Recommendations and Plan:    Pager [phone]  After 5 pm/weekends or if no response :[phone]

## 2018-08-16 LAB
ANION GAP SERPL CALC-SCNC: 9 MMOL/L — SIGNIFICANT CHANGE UP (ref 5–17)
BASOPHILS # BLD AUTO: 0.02 K/UL — SIGNIFICANT CHANGE UP (ref 0–0.2)
BASOPHILS NFR BLD AUTO: 0.3 % — SIGNIFICANT CHANGE UP (ref 0–2)
BUN SERPL-MCNC: 8 MG/DL — SIGNIFICANT CHANGE UP (ref 7–23)
CALCIUM SERPL-MCNC: 8.7 MG/DL — SIGNIFICANT CHANGE UP (ref 8.4–10.5)
CHLORIDE SERPL-SCNC: 100 MMOL/L — SIGNIFICANT CHANGE UP (ref 96–108)
CO2 SERPL-SCNC: 26 MMOL/L — SIGNIFICANT CHANGE UP (ref 22–31)
CREAT SERPL-MCNC: 1.13 MG/DL — SIGNIFICANT CHANGE UP (ref 0.5–1.3)
EOSINOPHIL # BLD AUTO: 0.04 K/UL — SIGNIFICANT CHANGE UP (ref 0–0.5)
EOSINOPHIL NFR BLD AUTO: 0.6 % — SIGNIFICANT CHANGE UP (ref 0–6)
GLUCOSE SERPL-MCNC: 91 MG/DL — SIGNIFICANT CHANGE UP (ref 70–99)
HCT VFR BLD CALC: 38.4 % — LOW (ref 39–50)
HGB BLD-MCNC: 13 G/DL — SIGNIFICANT CHANGE UP (ref 13–17)
IMM GRANULOCYTES NFR BLD AUTO: 0.3 % — SIGNIFICANT CHANGE UP (ref 0–1.5)
LYMPHOCYTES # BLD AUTO: 2.48 K/UL — SIGNIFICANT CHANGE UP (ref 1–3.3)
LYMPHOCYTES # BLD AUTO: 34.2 % — SIGNIFICANT CHANGE UP (ref 13–44)
MCHC RBC-ENTMCNC: 29 PG — SIGNIFICANT CHANGE UP (ref 27–34)
MCHC RBC-ENTMCNC: 33.9 GM/DL — SIGNIFICANT CHANGE UP (ref 32–36)
MCV RBC AUTO: 85.5 FL — SIGNIFICANT CHANGE UP (ref 80–100)
MONOCYTES # BLD AUTO: 0.69 K/UL — SIGNIFICANT CHANGE UP (ref 0–0.9)
MONOCYTES NFR BLD AUTO: 9.5 % — SIGNIFICANT CHANGE UP (ref 2–14)
NEUTROPHILS # BLD AUTO: 4.01 K/UL — SIGNIFICANT CHANGE UP (ref 1.8–7.4)
NEUTROPHILS NFR BLD AUTO: 55.1 % — SIGNIFICANT CHANGE UP (ref 43–77)
PLATELET # BLD AUTO: 188 K/UL — SIGNIFICANT CHANGE UP (ref 150–400)
POTASSIUM SERPL-MCNC: 3.9 MMOL/L — SIGNIFICANT CHANGE UP (ref 3.5–5.3)
POTASSIUM SERPL-SCNC: 3.9 MMOL/L — SIGNIFICANT CHANGE UP (ref 3.5–5.3)
RBC # BLD: 4.49 M/UL — SIGNIFICANT CHANGE UP (ref 4.2–5.8)
RBC # FLD: 12.7 % — SIGNIFICANT CHANGE UP (ref 10.3–14.5)
SODIUM SERPL-SCNC: 135 MMOL/L — SIGNIFICANT CHANGE UP (ref 135–145)
WBC # BLD: 7.26 K/UL — SIGNIFICANT CHANGE UP (ref 3.8–10.5)
WBC # FLD AUTO: 7.26 K/UL — SIGNIFICANT CHANGE UP (ref 3.8–10.5)

## 2018-08-16 PROCEDURE — 99232 SBSQ HOSP IP/OBS MODERATE 35: CPT

## 2018-08-16 PROCEDURE — 70553 MRI BRAIN STEM W/O & W/DYE: CPT | Mod: 26

## 2018-08-16 RX ORDER — IBUPROFEN 200 MG
400 TABLET ORAL ONCE
Qty: 0 | Refills: 0 | Status: COMPLETED | OUTPATIENT
Start: 2018-08-16 | End: 2018-08-16

## 2018-08-16 RX ORDER — ACETAMINOPHEN 500 MG
650 TABLET ORAL ONCE
Qty: 0 | Refills: 0 | Status: COMPLETED | OUTPATIENT
Start: 2018-08-16 | End: 2018-08-16

## 2018-08-16 RX ADMIN — Medication 268 MILLIGRAM(S): at 21:51

## 2018-08-16 RX ADMIN — Medication 650 MILLIGRAM(S): at 16:30

## 2018-08-16 RX ADMIN — Medication 650 MILLIGRAM(S): at 11:00

## 2018-08-16 RX ADMIN — ONDANSETRON 4 MILLIGRAM(S): 8 TABLET, FILM COATED ORAL at 05:39

## 2018-08-16 RX ADMIN — Medication 268 MILLIGRAM(S): at 05:39

## 2018-08-16 RX ADMIN — Medication 650 MILLIGRAM(S): at 22:53

## 2018-08-16 RX ADMIN — Medication 400 MILLIGRAM(S): at 20:04

## 2018-08-16 RX ADMIN — Medication 400 MILLIGRAM(S): at 19:04

## 2018-08-16 RX ADMIN — Medication 650 MILLIGRAM(S): at 21:53

## 2018-08-16 RX ADMIN — Medication 650 MILLIGRAM(S): at 16:00

## 2018-08-16 RX ADMIN — Medication 650 MILLIGRAM(S): at 10:23

## 2018-08-16 RX ADMIN — Medication 268 MILLIGRAM(S): at 12:53

## 2018-08-16 RX ADMIN — SODIUM CHLORIDE 75 MILLILITER(S): 9 INJECTION INTRAMUSCULAR; INTRAVENOUS; SUBCUTANEOUS at 10:24

## 2018-08-16 RX ADMIN — Medication 650 MILLIGRAM(S): at 05:39

## 2018-08-16 NOTE — CHART NOTE - NSCHARTNOTEFT_GEN_A_CORE
Called by RN for intermittent headache.    Pt. seen and evaluated at bedside.   Motrin x1 administered .    Neurology following   Pt. to have MRI tonight. Will f/u results.   Kaylen Richardson Fnp-bc

## 2018-08-16 NOTE — PROGRESS NOTE ADULT - PROBLEM SELECTOR PLAN 1
viral  meningitis, to  get  MRI  brain  today  will  D/C azyclovir  in  AM   continue on  tylenol  po   prn  pain  .ID  consult  appreciated /

## 2018-08-17 ENCOUNTER — TRANSCRIPTION ENCOUNTER (OUTPATIENT)
Age: 38
End: 2018-08-17

## 2018-08-17 VITALS
SYSTOLIC BLOOD PRESSURE: 121 MMHG | TEMPERATURE: 98 F | DIASTOLIC BLOOD PRESSURE: 79 MMHG | HEART RATE: 65 BPM | OXYGEN SATURATION: 97 % | RESPIRATION RATE: 18 BRPM

## 2018-08-17 LAB
ANION GAP SERPL CALC-SCNC: 12 MMOL/L — SIGNIFICANT CHANGE UP (ref 5–17)
BUN SERPL-MCNC: 6 MG/DL — LOW (ref 7–23)
CALCIUM SERPL-MCNC: 8.6 MG/DL — SIGNIFICANT CHANGE UP (ref 8.4–10.5)
CHLORIDE SERPL-SCNC: 100 MMOL/L — SIGNIFICANT CHANGE UP (ref 96–108)
CO2 SERPL-SCNC: 25 MMOL/L — SIGNIFICANT CHANGE UP (ref 22–31)
CREAT SERPL-MCNC: 1.11 MG/DL — SIGNIFICANT CHANGE UP (ref 0.5–1.3)
CULTURE RESULTS: NO GROWTH — SIGNIFICANT CHANGE UP
GLUCOSE SERPL-MCNC: 111 MG/DL — HIGH (ref 70–99)
HCT VFR BLD CALC: 38.4 % — LOW (ref 39–50)
HGB BLD-MCNC: 12.8 G/DL — LOW (ref 13–17)
MCHC RBC-ENTMCNC: 28.5 PG — SIGNIFICANT CHANGE UP (ref 27–34)
MCHC RBC-ENTMCNC: 33.3 GM/DL — SIGNIFICANT CHANGE UP (ref 32–36)
MCV RBC AUTO: 85.5 FL — SIGNIFICANT CHANGE UP (ref 80–100)
PLATELET # BLD AUTO: 200 K/UL — SIGNIFICANT CHANGE UP (ref 150–400)
POTASSIUM SERPL-MCNC: 3.7 MMOL/L — SIGNIFICANT CHANGE UP (ref 3.5–5.3)
POTASSIUM SERPL-SCNC: 3.7 MMOL/L — SIGNIFICANT CHANGE UP (ref 3.5–5.3)
RBC # BLD: 4.49 M/UL — SIGNIFICANT CHANGE UP (ref 4.2–5.8)
RBC # FLD: 12.5 % — SIGNIFICANT CHANGE UP (ref 10.3–14.5)
SODIUM SERPL-SCNC: 137 MMOL/L — SIGNIFICANT CHANGE UP (ref 135–145)
SPECIMEN SOURCE: SIGNIFICANT CHANGE UP
WBC # BLD: 7.88 K/UL — SIGNIFICANT CHANGE UP (ref 3.8–10.5)
WBC # FLD AUTO: 7.88 K/UL — SIGNIFICANT CHANGE UP (ref 3.8–10.5)

## 2018-08-17 PROCEDURE — 81001 URINALYSIS AUTO W/SCOPE: CPT

## 2018-08-17 PROCEDURE — 85027 COMPLETE CBC AUTOMATED: CPT

## 2018-08-17 PROCEDURE — 99285 EMERGENCY DEPT VISIT HI MDM: CPT | Mod: 25

## 2018-08-17 PROCEDURE — 87205 SMEAR GRAM STAIN: CPT

## 2018-08-17 PROCEDURE — 87483 CNS DNA AMP PROBE TYPE 12-25: CPT

## 2018-08-17 PROCEDURE — 80048 BASIC METABOLIC PNL TOTAL CA: CPT

## 2018-08-17 PROCEDURE — 96365 THER/PROPH/DIAG IV INF INIT: CPT | Mod: XU

## 2018-08-17 PROCEDURE — 87633 RESP VIRUS 12-25 TARGETS: CPT

## 2018-08-17 PROCEDURE — 84100 ASSAY OF PHOSPHORUS: CPT

## 2018-08-17 PROCEDURE — 87389 HIV-1 AG W/HIV-1&-2 AB AG IA: CPT

## 2018-08-17 PROCEDURE — 84157 ASSAY OF PROTEIN OTHER: CPT

## 2018-08-17 PROCEDURE — 96375 TX/PRO/DX INJ NEW DRUG ADDON: CPT | Mod: XU

## 2018-08-17 PROCEDURE — 70553 MRI BRAIN STEM W/O & W/DYE: CPT

## 2018-08-17 PROCEDURE — 70450 CT HEAD/BRAIN W/O DYE: CPT

## 2018-08-17 PROCEDURE — 99232 SBSQ HOSP IP/OBS MODERATE 35: CPT

## 2018-08-17 PROCEDURE — 87486 CHLMYD PNEUM DNA AMP PROBE: CPT

## 2018-08-17 PROCEDURE — 84145 PROCALCITONIN (PCT): CPT

## 2018-08-17 PROCEDURE — 87086 URINE CULTURE/COLONY COUNT: CPT

## 2018-08-17 PROCEDURE — 83690 ASSAY OF LIPASE: CPT

## 2018-08-17 PROCEDURE — 87040 BLOOD CULTURE FOR BACTERIA: CPT

## 2018-08-17 PROCEDURE — 96367 TX/PROPH/DG ADDL SEQ IV INF: CPT | Mod: XU

## 2018-08-17 PROCEDURE — 87070 CULTURE OTHR SPECIMN AEROBIC: CPT

## 2018-08-17 PROCEDURE — 82550 ASSAY OF CK (CPK): CPT

## 2018-08-17 PROCEDURE — 87581 M.PNEUMON DNA AMP PROBE: CPT

## 2018-08-17 PROCEDURE — 83605 ASSAY OF LACTIC ACID: CPT

## 2018-08-17 PROCEDURE — 87798 DETECT AGENT NOS DNA AMP: CPT

## 2018-08-17 PROCEDURE — A9585: CPT

## 2018-08-17 PROCEDURE — 93005 ELECTROCARDIOGRAM TRACING: CPT

## 2018-08-17 PROCEDURE — 95819 EEG AWAKE AND ASLEEP: CPT

## 2018-08-17 PROCEDURE — 82945 GLUCOSE OTHER FLUID: CPT

## 2018-08-17 PROCEDURE — 83735 ASSAY OF MAGNESIUM: CPT

## 2018-08-17 PROCEDURE — 89051 BODY FLUID CELL COUNT: CPT

## 2018-08-17 PROCEDURE — 80053 COMPREHEN METABOLIC PANEL: CPT

## 2018-08-17 PROCEDURE — 62270 DX LMBR SPI PNXR: CPT

## 2018-08-17 RX ORDER — VALACYCLOVIR 500 MG/1
1 TABLET, FILM COATED ORAL
Qty: 21 | Refills: 0 | OUTPATIENT
Start: 2018-08-17 | End: 2018-08-23

## 2018-08-17 RX ORDER — ACETAMINOPHEN 500 MG
2 TABLET ORAL
Qty: 0 | Refills: 0 | COMMUNITY
Start: 2018-08-17

## 2018-08-17 RX ADMIN — Medication 650 MILLIGRAM(S): at 11:24

## 2018-08-17 RX ADMIN — SODIUM CHLORIDE 75 MILLILITER(S): 9 INJECTION INTRAMUSCULAR; INTRAVENOUS; SUBCUTANEOUS at 05:10

## 2018-08-17 RX ADMIN — Medication 650 MILLIGRAM(S): at 05:12

## 2018-08-17 RX ADMIN — Medication 268 MILLIGRAM(S): at 05:11

## 2018-08-17 RX ADMIN — Medication 650 MILLIGRAM(S): at 12:05

## 2018-08-17 NOTE — DISCHARGE NOTE ADULT - MEDICATION SUMMARY - MEDICATIONS TO STOP TAKING
I will STOP taking the medications listed below when I get home from the hospital:    acetaminophen-oxyCODONE 325 mg-5 mg oral tablet  -- 1 tab(s) by mouth every 4 hours, As needed, Moderate Pain    acetaminophen-oxyCODONE 325 mg-5 mg oral tablet  -- 2 tab(s) by mouth every 6 hours, As needed, Severe Pain

## 2018-08-17 NOTE — DISCHARGE NOTE ADULT - CARE PROVIDER_API CALL
Raffi Lopez), Medicine  [address] [place] [address]  [address] [place] [address]  Phone: [phone]  Fax: [phone]    Bakari Boss), Infectious Disease; Internal Medicine  [address] [place] [address]  Phone: [phone]  Fax: [phone]

## 2018-08-17 NOTE — DISCHARGE NOTE ADULT - CARE PROVIDERS DIRECT ADDRESSES
,[email or web address]

## 2018-08-17 NOTE — PROGRESS NOTE ADULT - ASSESSMENT
37 yr old with HA, fever, no confusion, has normal glucose and lymphocytes in spinal fluid  no epidemiologic exposures   non toxic   appearance is most consistent with viral meningitis and I suspect that the PCR for varicella is a false positive . He has no rash or recent zoster.  I suspect enterovirus and not varicella  ( there are false positives) I have asked  Dr. Haroldo magana from the lab to look into this result    await MRI and then we can consider stopping the acyclovir is normal   Discussed with medicine and family  He seems to be gradually getting better I will be away tomorrow but he will be seen by ID service
37 year old male with viral meningitis.  Currently maintained on Acyclovir until MRI brain complete.  EEG does not show any abnormalities, including those associated with HSV encephalitis.    Headache:  while currently responsive to IV tylenol we have to consider his response to oral formulations.  If his headache is severe after trial of oral acetaminophen or ibuprofen would consider judicious use of percocet.
Imp/Rx:  Young man with aseptic meningitis.  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  Perhaps shedding?  But, with aseptic meningitis and no other clear diagnosis, I think it is possible that he has some form of VZV infection.  Perhaps an atypical form.  He's stable clinically.  If MRI is ok, I'd suggest he could be dc'd on valtrex 1000 mg three times a day for 7 days.    Should have outpatient follow up in next 5-7 days

## 2018-08-17 NOTE — DISCHARGE NOTE ADULT - CARE PLAN
Principal Discharge DX:	Viral meningitis, unspecified  Goal:	stable  Assessment and plan of treatment:	Please follow up with Infectious diseases and your PMD.  Please continue with current medication regimen  If headaches persists or worsens, please call PMD/or go to the ED for evaluation  Secondary Diagnosis:	Asthma, unspecified asthma severity, unspecified whether complicated, unspecified whether persistent  Assessment and plan of treatment:	Asthma attacks happen when the airways in the lungs become narrow and inflamed  Asthma symptoms can include - Wheezing or noisy breathing, coughing, tight feeling in the chest, shortness of breath  Almost everyone with asthma has a quick-relief inhaler that works in 5- 10mins that they carry with them and long-term controller medicines control asthma and prevent future symptoms. People with frequent asthma symptoms take these 1 or 2 times each day.  You can stay away from things that cause your symptoms or make them worse. Doctors call these "triggers."  avoid them as much as possible. Some common triggers include - Dust, mold, animals, such as dogs and cats, pollen and plants, cigarette smoke, getting sick with a cold or flu (that's why it's important to get a flu shot), stress  Talk to your caregiver about an action plan for managing asthma attacks. This includes the use of a peak flow meter which measures the severity of the attack and medicines that can help stop the attack.   SEEK MEDICAL CARE IF:  You have wheezing, shortness of breath, or a cough even if taking medicine to prevent attacks.   You have thickening of sputum, sputum changes from clear or white to yellow, green, gray, or bloody.   You have any problems that may be related to the medicines you are taking (such as a rash, itching, swelling, or trouble breathing).  You are using a reliever medicine more than 2–3 times per week.  Your peak flow is still at 50–79% of personal best after following your action plan for 1 hour.  SEEK IMMEDIATE MEDICAL CARE IF:  You are short of breath even at rest,or with very little physical activity, chest pain, heart beating fast, bluish color to your lips or fingernails, fever, dizziness,   You seem to be getting worse and are unresponsive to treatment during an asthma attack.   Your peak flow is less than 50% of personal best.

## 2018-08-17 NOTE — DISCHARGE NOTE ADULT - PATIENT PORTAL LINK FT
You can access the [facility] Patient Portal, offered by [facility], by registering with the following website: [email or web address]

## 2018-08-17 NOTE — PROGRESS NOTE ADULT - PROVIDER SPECIALTY LIST ADULT
Infectious Disease
Internal Medicine
Neurology
Infectious Disease

## 2018-08-17 NOTE — DISCHARGE NOTE ADULT - MEDICATION SUMMARY - MEDICATIONS TO TAKE
I will START or STAY ON the medications listed below when I get home from the hospital:    acetaminophen 325 mg oral tablet  -- 2 tab(s) by mouth every 6 hours, As Needed  -- Indication: For Pain    ZyrTEC  -- Indication: For Allergy     Valtrex 1 g oral tablet  -- 1 tab(s) by mouth 3 times a day   -- It is very important that you take or use this exactly as directed.  Do not skip doses or discontinue unless directed by your doctor.    -- Indication: For Prophylactic measure

## 2018-08-17 NOTE — DISCHARGE NOTE ADULT - PLAN OF CARE
stable Please follow up with Infectious diseases and your PMD.  Please continue with current medication regimen  If headaches persists or worsens, please call PMD/or go to the ED for evaluation Asthma attacks happen when the airways in the lungs become narrow and inflamed  Asthma symptoms can include - Wheezing or noisy breathing, coughing, tight feeling in the chest, shortness of breath  Almost everyone with asthma has a quick-relief inhaler that works in 5- 10mins that they carry with them and long-term controller medicines control asthma and prevent future symptoms. People with frequent asthma symptoms take these 1 or 2 times each day.  You can stay away from things that cause your symptoms or make them worse. Doctors call these "triggers."  avoid them as much as possible. Some common triggers include - Dust, mold, animals, such as dogs and cats, pollen and plants, cigarette smoke, getting sick with a cold or flu (that's why it's important to get a flu shot), stress  Talk to your caregiver about an action plan for managing asthma attacks. This includes the use of a peak flow meter which measures the severity of the attack and medicines that can help stop the attack.   SEEK MEDICAL CARE IF:  You have wheezing, shortness of breath, or a cough even if taking medicine to prevent attacks.   You have thickening of sputum, sputum changes from clear or white to yellow, green, gray, or bloody.   You have any problems that may be related to the medicines you are taking (such as a rash, itching, swelling, or trouble breathing).  You are using a reliever medicine more than 2–3 times per week.  Your peak flow is still at 50–79% of personal best after following your action plan for 1 hour.  SEEK IMMEDIATE MEDICAL CARE IF:  You are short of breath even at rest,or with very little physical activity, chest pain, heart beating fast, bluish color to your lips or fingernails, fever, dizziness,   You seem to be getting worse and are unresponsive to treatment during an asthma attack.   Your peak flow is less than 50% of personal best.

## 2018-08-17 NOTE — DISCHARGE NOTE ADULT - HOSPITAL COURSE
37 yr old with HA, fever, no confusion, has normal glucose and lymphocytes in spinal fluid no epidemiologic exposures  non toxic  appearance is most consistent with viral meningitis and  suspect that the PCR for varicella is a false positive . He has no rash or recent zoster.  suspect enterovirus and not varicella  ( there are false positives). ID noted. MRI performed - no significant findings   Pt stable for discharge with follow up with his PMD and Infectious diseases.

## 2018-08-17 NOTE — PROGRESS NOTE ADULT - PROBLEM SELECTOR PLAN 1
pt  afebrile  , mild  headache , ambulating  ,  pending  MRI   results , if  negative   will  get  clearence  from ID for  discharge  this  AM . will  follow  in  office  in one  week ,instructed  on use  of  tylenol  for  pain  control

## 2018-08-17 NOTE — PROGRESS NOTE ADULT - SUBJECTIVE AND OBJECTIVE BOX
infectious diseases progress note:    Patient is a 37y old  Male who presents with a chief complaint of headache (14 Aug 2018 20:58)        Meningitis        ROS:  CONSTITUTIONAL:  Negative fever or chills, feels well, good appetite  EYES:  Negative  blurry vision or double vision  CARDIOVASCULAR:  Negative for chest pain or palpitations  RESPIRATORY:  Negative for cough, wheezing, or SOB   GASTROINTESTINAL:  Negative for nausea, vomiting, diarrhea, constipation, or abdominal pain  GENITOURINARY:  Negative frequency, urgency or dysuria  NEUROLOGIC:   headache, no  confusion,      Allergies    No Known Allergies    Intolerances        ANTIBIOTICS/RELEVANT:  antimicrobials  acyclovir IVPB      acyclovir IVPB 900 milliGRAM(s) IV Intermittent every 8 hours    immunologic:    OTHER:  acetaminophen   Tablet. 650 milliGRAM(s) Oral every 6 hours  ondansetron Injectable 4 milliGRAM(s) IV Push every 6 hours PRN  sodium chloride 0.9%. 1000 milliLiter(s) IV Continuous <Continuous>      Objective:  Vital Signs Last 24 Hrs  T(C): 36.7 (16 Aug 2018 05:37), Max: 37 (15 Aug 2018 18:41)  T(F): 98 (16 Aug 2018 05:37), Max: 98.6 (15 Aug 2018 18:41)  HR: 67 (16 Aug 2018 05:37) (67 - 75)  BP: 127/68 (16 Aug 2018 05:37) (101/67 - 128/74)  BP(mean): --  RR: 18 (16 Aug 2018 05:37) (18 - 18)  SpO2: 97% (16 Aug 2018 05:37) (94% - 97%)    PHYSICAL EXAM:  Constitutional:Well-developed, well nourished--no acute distress  Eyes:FABRICIO, EOMI  Ear/Nose/Throat: no oral lesion, no sinus tenderness on percussion	  Neck:no JVD, no lymphadenopathy, supple     Extremities:no e/e/c        LABS:                        13.0   7.26  )-----------( 188      ( 16 Aug 2018 07:45 )             38.4     08-16    135  |  100  |  8   ----------------------------<  91  3.9   |  26  |  1.13    Ca    8.7      16 Aug 2018 07:01  Phos  2.5     08-15  Mg     1.9     08-15    TPro  6.3  /  Alb  3.5  /  TBili  0.8  /  DBili  x   /  AST  21  /  ALT  33  /  AlkPhos  36<L>  08-15      Urinalysis Basic - ( 14 Aug 2018 18:34 )    Color: Yellow / Appearance: Clear / S.015 / pH: x  Gluc: x / Ketone: Trace  / Bili: Negative / Urobili: Negative   Blood: x / Protein: Trace / Nitrite: Negative   Leuk Esterase: Negative / RBC: 0-2 /HPF / WBC 0-2 /HPF   Sq Epi: x / Non Sq Epi: x / Bacteria: Few /HPF          MICROBIOLOGY:    RECENT CULTURES:  08-15 @ 02:27 .Blood Blood                No growth to date.     @ 22:13 .Urine Clean Catch (Midstream)                No growth     @ 17:46 .CSF CSF       polymorphonuclear leukocytes seen  No organisms seen  by cytocentrifuge           No growth to date.          RESPIRATORY CULTURES:              RADIOLOGY & ADDITIONAL STUDIES:        Pager [phone]  After 5 pm/weekends or if no response :[phone]
Chief complaint: pt  c/o  mild  headches  , no  fever ,  viral  cultures  on CSF   positive  for   Varicela Zoster  , presently  on  Acyclovir  , cultures  pending ,    HPI:  36 yo male with PMH ?Asthma, presents here with headache.  Patient states that on Saturday, he was at the gym and started having lightheadedness and felt weak.  On , he felt OK.  Monday, he woke up with severe headache diffusely.  He could not get out of bed all day.  Last night, patient started having nausea and vomiting several episodes.  He did not have any fever.  He also started having bodyache, joint pain.  Denies any sick contact, recent travel, runny nose, cough, SOB.  He had 2 episodes of diarrhea yesterday and one episode of diarrhea today.  After coming to ED, he started having neck stiffness and a rectal temp of 102.4.  He had phonophobia but no photophobia.  He denies any other complaints. (14 Aug 2018 20:58)      REVIEW OF SYSTEMS:    CONSTITUTIONAL: No fever, weight loss, or fatigue  NECK: No pain or stiffness  RESPIRATORY: No cough, wheezing, chills or hemoptysis; No shortness of breath  CARDIOVASCULAR: No chest pain, palpitations, dizziness, or leg swelling  GASTROINTESTINAL: No abdominal or epigastric pain. No nausea, vomiting, or hematemesis; No diarrhea or constipation. No melena or hematochezia.  GENITOURINARY: No dysuria, frequency, hematuria, or incontinence  NEUROLOGICAL: No headaches, memory loss, loss of strength, numbness, or tremors  SKIN: No itching, burning, rashes, or lesions   LYMPH NODES: No enlarged glands  MUSCULOSKELETAL: No joint pain or swelling; No muscle, back, or extremity pain  HEME/LYMPH: No easy bruising, or bleeding gums    MEDICATIONS  (STANDING):  acetaminophen   Tablet. 650 milliGRAM(s) Oral every 6 hours  acyclovir IVPB      acyclovir IVPB 900 milliGRAM(s) IV Intermittent every 8 hours  sodium chloride 0.9%. 1000 milliLiter(s) (75 mL/Hr) IV Continuous <Continuous>    MEDICATIONS  (PRN):  ondansetron Injectable 4 milliGRAM(s) IV Push every 6 hours PRN Nausea      Allergies    No Known Allergies    Intolerances        Vital Signs Last 24 Hrs  T(C): 36.6 (15 Aug 2018 05:48), Max: 39.1 (14 Aug 2018 14:00)  T(F): 97.9 (15 Aug 2018 05:48), Max: 102.4 (14 Aug 2018 14:00)  HR: 81 (15 Aug 2018 05:48) (67 - 92)  BP: 113/75 (15 Aug 2018 05:48) (100/42 - 127/54)  BP(mean): --  RR: 17 (15 Aug 2018 05:48) (16 - 22)  SpO2: 97% (15 Aug 2018 05:48) (96% - 100%)    PHYSICAL EXAM:    GENERAL: NAD, well-groomed, well-developed  HEAD:  Atraumatic, Normocephalic  EYES: EOMI, PERRLA, conjunctiva and sclera clear  ENMT: No tonsillar erythema, exudates, or enlargement; Moist mucous membranes, Good dentition, No lesions  NECK: Supple, No JVD, Normal thyroid  NERVOUS SYSTEM:  Alert & Oriented X3, Good concentration; Motor Strength 5/5 B/L upper and lower extremities; DTRs 2+ intact and symmetric  CHEST/LUNG: Clear to percussion bilaterally; No rales, rhonchi, wheezing, or rubs  HEART: Regular rate and rhythm; No murmurs, rubs, or gallops  ABDOMEN: Soft, Nontender, Nondistended; Bowel sounds present  EXTREMITIES:  2+ Peripheral Pulses, No clubbing, cyanosis, or edema  LYMPH: No lymphadenopathy noted  SKIN: No rashes or lesions      LABS:                        12.6   8.38  )-----------( 192      ( 15 Aug 2018 07:26 )             37.9     08-15    139  |  104  |  9   ----------------------------<  109<H>  3.8   |  24  |  1.15    Ca    8.3<L>      15 Aug 2018 06:32  Phos  2.5     08-15  Mg     1.9     08-15    TPro  6.3  /  Alb  3.5  /  TBili  0.8  /  DBili  x   /  AST  21  /  ALT  33  /  AlkPhos  36<L>  08-15      Urinalysis Basic - ( 14 Aug 2018 18:34 )    Color: Yellow / Appearance: Clear / S.015 / pH: x  Gluc: x / Ketone: Trace  / Bili: Negative / Urobili: Negative   Blood: x / Protein: Trace / Nitrite: Negative   Leuk Esterase: Negative / RBC: 0-2 /HPF / WBC 0-2 /HPF   Sq Epi: x / Non Sq Epi: x / Bacteria: Few /HPF        RADIOLOGY & ADDITIONAL STUDIES:
Chief complaint:pt  afebrile  , mild  headache , ambulating  ,  pending  MRI   results     HPI:  38 yo male with PMH ?Asthma, presents here with headache.  Patient states that on Saturday, he was at the gym and started having lightheadedness and felt weak.  On Sunday, he felt OK.  Monday, he woke up with severe headache diffusely.  He could not get out of bed all day.  Last night, patient started having nausea and vomiting several episodes.  He did not have any fever.  He also started having bodyache, joint pain.  Denies any sick contact, recent travel, runny nose, cough, SOB.  He had 2 episodes of diarrhea yesterday and one episode of diarrhea today.  After coming to ED, he started having neck stiffness and a rectal temp of 102.4.  He had phonophobia but no photophobia.  He denies any other complaints. (14 Aug 2018 20:58)      REVIEW OF SYSTEMS:    CONSTITUTIONAL: No fever, weight loss, or fatigue  NECK: No pain or stiffness  RESPIRATORY: No cough, wheezing, chills or hemoptysis; No shortness of breath  CARDIOVASCULAR: No chest pain, palpitations, dizziness, or leg swelling  GASTROINTESTINAL: No abdominal or epigastric pain. No nausea, vomiting, or hematemesis; No diarrhea or constipation. No melena or hematochezia.  GENITOURINARY: No dysuria, frequency, hematuria, or incontinence  NEUROLOGICAL: No headaches, memory loss, loss of strength, numbness, or tremors  SKIN: No itching, burning, rashes, or lesions   LYMPH NODES: No enlarged glands  MUSCULOSKELETAL: No joint pain or swelling; No muscle, back, or extremity pain  HEME/LYMPH: No easy bruising, or bleeding gums    MEDICATIONS  (STANDING):  acetaminophen   Tablet. 650 milliGRAM(s) Oral every 6 hours  sodium chloride 0.9%. 1000 milliLiter(s) (75 mL/Hr) IV Continuous <Continuous>    MEDICATIONS  (PRN):  ondansetron Injectable 4 milliGRAM(s) IV Push every 6 hours PRN Nausea      Allergies    No Known Allergies    Intolerances        Vital Signs Last 24 Hrs  T(C): 36.9 (17 Aug 2018 05:13), Max: 36.9 (17 Aug 2018 05:13)  T(F): 98.4 (17 Aug 2018 05:13), Max: 98.4 (17 Aug 2018 05:13)  HR: 65 (17 Aug 2018 05:13) (65 - 69)  BP: 121/79 (17 Aug 2018 05:13) (103/66 - 121/79)  BP(mean): --  RR: 18 (17 Aug 2018 05:13) (18 - 18)  SpO2: 97% (17 Aug 2018 05:13) (95% - 97%)    PHYSICAL EXAM:    GENERAL: NAD, well-groomed, well-developed  HEAD:  Atraumatic, Normocephalic  EYES: EOMI, PERRLA, conjunctiva and sclera clear  ENMT: No tonsillar erythema, exudates, or enlargement; Moist mucous membranes, Good dentition, No lesions  NECK: Supple, No JVD, Normal thyroid  NERVOUS SYSTEM:  Alert & Oriented X3, Good concentration; Motor Strength 5/5 B/L upper and lower extremities; DTRs 2+ intact and symmetric  CHEST/LUNG: Clear to percussion bilaterally; No rales, rhonchi, wheezing, or rubs  HEART: Regular rate and rhythm; No murmurs, rubs, or gallops  ABDOMEN: Soft, Nontender, Nondistended; Bowel sounds present  EXTREMITIES:  2+ Peripheral Pulses, No clubbing, cyanosis, or edema  LYMPH: No lymphadenopathy noted  SKIN: No rashes or lesions      LABS:                        13.0   7.26  )-----------( 188      ( 16 Aug 2018 07:45 )             38.4     08-16    135  |  100  |  8   ----------------------------<  91  3.9   |  26  |  1.13    Ca    8.7      16 Aug 2018 07:01            RADIOLOGY & ADDITIONAL STUDIES:
Chief complaint:pt  c/o less  headaches  no  fever  poor  apetete.    HPI:  36 yo male with PMH ?Asthma, presents here with headache.  Patient states that on Saturday, he was at the gym and started having lightheadedness and felt weak.  On , he felt OK.  Monday, he woke up with severe headache diffusely.  He could not get out of bed all day.  Last night, patient started having nausea and vomiting several episodes.  He did not have any fever.  He also started having bodyache, joint pain.  Denies any sick contact, recent travel, runny nose, cough, SOB.  He had 2 episodes of diarrhea yesterday and one episode of diarrhea today.  After coming to ED, he started having neck stiffness and a rectal temp of 102.4.  He had phonophobia but no photophobia.  He denies any other complaints. (14 Aug 2018 20:58)      REVIEW OF SYSTEMS:    CONSTITUTIONAL: No fever, weight loss, or fatigue  NECK: No pain or stiffness  RESPIRATORY: No cough, wheezing, chills or hemoptysis; No shortness of breath  CARDIOVASCULAR: No chest pain, palpitations, dizziness, or leg swelling  GASTROINTESTINAL: No abdominal or epigastric pain. No nausea, vomiting, or hematemesis; No diarrhea or constipation. No melena or hematochezia.  GENITOURINARY: No dysuria, frequency, hematuria, or incontinence  NEUROLOGICAL: No headaches, memory loss, loss of strength, numbness, or tremors  SKIN: No itching, burning, rashes, or lesions   LYMPH NODES: No enlarged glands  MUSCULOSKELETAL: No joint pain or swelling; No muscle, back, or extremity pain  HEME/LYMPH: No easy bruising, or bleeding gums    MEDICATIONS  (STANDING):  acetaminophen   Tablet. 650 milliGRAM(s) Oral every 6 hours  acyclovir IVPB      acyclovir IVPB 900 milliGRAM(s) IV Intermittent every 8 hours  sodium chloride 0.9%. 1000 milliLiter(s) (75 mL/Hr) IV Continuous <Continuous>    MEDICATIONS  (PRN):  ondansetron Injectable 4 milliGRAM(s) IV Push every 6 hours PRN Nausea      Allergies    No Known Allergies    Intolerances        Vital Signs Last 24 Hrs  T(C): 36.7 (16 Aug 2018 05:37), Max: 37 (15 Aug 2018 18:41)  T(F): 98 (16 Aug 2018 05:37), Max: 98.6 (15 Aug 2018 18:41)  HR: 67 (16 Aug 2018 05:37) (67 - 75)  BP: 127/68 (16 Aug 2018 05:37) (101/67 - 128/74)  BP(mean): --  RR: 18 (16 Aug 2018 05:37) (18 - 18)  SpO2: 97% (16 Aug 2018 05:37) (94% - 97%)    PHYSICAL EXAM:    GENERAL: NAD, well-groomed, well-developed  HEAD:  Atraumatic, Normocephalic  EYES: EOMI, PERRLA, conjunctiva and sclera clear  ENMT: No tonsillar erythema, exudates, or enlargement; Moist mucous membranes, Good dentition, No lesions  NECK: Supple, No JVD, Normal thyroid  NERVOUS SYSTEM:  Alert & Oriented X3, Good concentration; Motor Strength 5/5 B/L upper and lower extremities; DTRs 2+ intact and symmetric  CHEST/LUNG: Clear to percussion bilaterally; No rales, rhonchi, wheezing, or rubs  HEART: Regular rate and rhythm; No murmurs, rubs, or gallops  ABDOMEN: Soft, Nontender, Nondistended; Bowel sounds present  EXTREMITIES:  2+ Peripheral Pulses, No clubbing, cyanosis, or edema  LYMPH: No lymphadenopathy noted  SKIN: No rashes or lesions      LABS:                        13.0   7.26  )-----------( 188      ( 16 Aug 2018 07:45 )             38.4     08-16    135  |  100  |  8   ----------------------------<  91  3.9   |  26  |  1.13    Ca    8.7      16 Aug 2018 07:01  Phos  2.5     08-15  Mg     1.9     08-15    TPro  6.3  /  Alb  3.5  /  TBili  0.8  /  DBili  x   /  AST  21  /  ALT  33  /  AlkPhos  36<L>  08-15      Urinalysis Basic - ( 14 Aug 2018 18:34 )    Color: Yellow / Appearance: Clear / S.015 / pH: x  Gluc: x / Ketone: Trace  / Bili: Negative / Urobili: Negative   Blood: x / Protein: Trace / Nitrite: Negative   Leuk Esterase: Negative / RBC: 0-2 /HPF / WBC 0-2 /HPF   Sq Epi: x / Non Sq Epi: x / Bacteria: Few /HPF        RADIOLOGY & ADDITIONAL STUDIES:
INFECTIOUS DISEASES FOLLOW UP--Guy Handley MD  Pager [phone]    This is a follow up note for this  37y Male with  Meningitis  neck pain resolved  HIV neg this admission  VZV in CSF + but no cutaneous findings.  + Rec'd varicella vaccine as child  no fevers and not confused    Further ROS:  CONSTITUTIONAL:  No fever, good appetite  CARDIOVASCULAR:  No chest pain or palpitations  RESPIRATORY:  No dyspnea  GASTROINTESTINAL:  No nausea, vomiting, diarrhea, or abdominal pain  GENITOURINARY:  No dysuria  NEUROLOGIC:  No headache,     Allergies  No Known Allergies    ANTIBIOTICS/RELEVANT:  antimicrobials    OTHER:  acetaminophen   Tablet. 650 milliGRAM(s) Oral every 6 hours  ondansetron Injectable 4 milliGRAM(s) IV Push every 6 hours PRN  sodium chloride 0.9%. 1000 milliLiter(s) IV Continuous <Continuous>    Objective:  Vital Signs Last 24 Hrs  T(C): 36.9 (17 Aug 2018 05:13), Max: 36.9 (17 Aug 2018 05:13)  T(F): 98.4 (17 Aug 2018 05:13), Max: 98.4 (17 Aug 2018 05:13)  HR: 65 (17 Aug 2018 05:13) (65 - 69)  BP: 121/79 (17 Aug 2018 05:13) (103/66 - 121/79)  BP(mean): --  RR: 18 (17 Aug 2018 05:13) (18 - 18)  SpO2: 97% (17 Aug 2018 05:13) (95% - 97%)    PHYSICAL EXAM:  Constitutional:no acute distress  Eyes:FABRICIO, EOMI  Ear/Nose/Throat: no oral lesions, 	  Respiratory: clear BL  Cardiovascular: S1S2  Gastrointestinal:soft, (+) BS, no tenderness  Extremities:no e/e/c  No Lymphadenopathy  IV sites not inflammed.    LABS:                        12.8   7.88  )-----------( 200      ( 17 Aug 2018 08:02 )             38.4     08-17    137  |  100  |  6<L>  ----------------------------<  111<H>  3.7   |  25  |  1.11    Ca    8.6      17 Aug 2018 07:26    MICROBIOLOGY: CSF with VZV    RADIOLOGY & ADDITIONAL STUDIES:  MRI result pending
INTERVAL HPI/OVERNIGHT EVENTS:    Patient notes intermittent headache off meds.  No neurologic change.    MEDICATIONS  (STANDING):  acetaminophen   Tablet. 650 milliGRAM(s) Oral every 6 hours  acyclovir IVPB      acyclovir IVPB 900 milliGRAM(s) IV Intermittent every 8 hours  sodium chloride 0.9%. 1000 milliLiter(s) (75 mL/Hr) IV Continuous <Continuous>    MEDICATIONS  (PRN):  ondansetron Injectable 4 milliGRAM(s) IV Push every 6 hours PRN Nausea      Allergies    No Known Allergies    Intolerances        Vital Signs Last 24 Hrs  T(C): 36.6 (16 Aug 2018 11:46), Max: 37 (15 Aug 2018 18:41)  T(F): 97.9 (16 Aug 2018 11:46), Max: 98.6 (15 Aug 2018 18:41)  HR: 69 (16 Aug 2018 11:46) (67 - 75)  BP: 103/66 (16 Aug 2018 11:46) (101/67 - 127/68)  BP(mean): --  RR: 18 (16 Aug 2018 11:46) (18 - 18)  SpO2: 95% (16 Aug 2018 11:46) (95% - 97%)    Neurological Exam:  Mental Status: AAOx3, fluent speech, follows simple commands, able to name    Cranial Nerves: EOMI, PERRL, VFF, V1-V3 intact, facial symmetric, no dysarthria, tongue midline    Motor: No drift in UE  Left UE:  Deltoid 5/5  Bicep 5/5 Tricep 5/5 Wrist Extension 5/5 Wrist Flexion 5/5 Finger Flexion 5/5 Finger Extension 5/5 APB 5/5 Dorsal Interossei 5/5  Right UE:  Deltoid 5/5  Bicep 5/5 Tricep 5/5 Wrist Extension 5/5 Wrist Flexion 5/5 Finger Flexion 5/5 Finger Extension 5/5 APB 5/5 Dorsal Interossei 5/5  Left LE:  Hip Flex 5/5 Hip Add 5/5 Hip Abd 5/5 Knee Ext 5/5 Dorsiflexion 5/5 Eversion 5/5 Inversion 5/5 Plantar Flexion 5/5  Right LE:  Hip Flex 5/5 Hip Add 5/5 Hip Abd 5/5 Knee Ext 5/5 Dorsiflexion 5/5 Eversion 5/5 Inversion 5/5 Plantar Flexion 5/5    Sensation:   Intact to LT throughout  Intact to PP throughout  Intact to Vibration throughout    Coordination: FTN intact b/l    Reflexes: 2+ bilateral biceps, brachioradialis, patellar and ankle       Labs:     CBC Full  -  ( 16 Aug 2018 07:45 )  WBC Count : 7.26 K/uL  Hemoglobin : 13.0 g/dL  Hematocrit : 38.4 %  Platelet Count - Automated : 188 K/uL  Mean Cell Volume : 85.5 fl  Mean Cell Hemoglobin : 29.0 pg  Mean Cell Hemoglobin Concentration : 33.9 gm/dL  Auto Neutrophil # : 4.01 K/uL  Auto Lymphocyte # : 2.48 K/uL  Auto Monocyte # : 0.69 K/uL  Auto Eosinophil # : 0.04 K/uL  Auto Basophil # : 0.02 K/uL  Auto Neutrophil % : 55.1 %  Auto Lymphocyte % : 34.2 %  Auto Monocyte % : 9.5 %  Auto Eosinophil % : 0.6 %  Auto Basophil % : 0.3 %    08    135  |  100  |  8   ----------------------------<  91  3.9   |  26  |  1.13    Ca    8.7      16 Aug 2018 07:01  Phos  2.5     08-15  Mg     1.9     08-15    TPro  6.3  /  Alb  3.5  /  TBili  0.8  /  DBili  x   /  AST  21  /  ALT  33  /  AlkPhos  36<L>  08-15    LIVER FUNCTIONS - ( 15 Aug 2018 06:32 )  Alb: 3.5 g/dL / Pro: 6.3 g/dL / ALK PHOS: 36 U/L / ALT: 33 U/L / AST: 21 U/L / GGT: x             Urinalysis Basic - ( 14 Aug 2018 18:34 )    Color: Yellow / Appearance: Clear / S.015 / pH: x  Gluc: x / Ketone: Trace  / Bili: Negative / Urobili: Negative   Blood: x / Protein: Trace / Nitrite: Negative   Leuk Esterase: Negative / RBC: 0-2 /HPF / WBC 0-2 /HPF   Sq Epi: x / Non Sq Epi: x / Bacteria: Few /HPF        Radiology:

## 2018-08-20 LAB
CULTURE RESULTS: SIGNIFICANT CHANGE UP
CULTURE RESULTS: SIGNIFICANT CHANGE UP
SPECIMEN SOURCE: SIGNIFICANT CHANGE UP
SPECIMEN SOURCE: SIGNIFICANT CHANGE UP

## 2019-04-21 ENCOUNTER — EMERGENCY (EMERGENCY)
Facility: HOSPITAL | Age: 39
LOS: 1 days | Discharge: HOME | End: 2019-04-21
Attending: EMERGENCY MEDICINE
Payer: MEDICAID

## 2019-04-21 VITALS
RESPIRATION RATE: 18 BRPM | HEART RATE: 71 BPM | DIASTOLIC BLOOD PRESSURE: 70 MMHG | TEMPERATURE: 98 F | OXYGEN SATURATION: 98 % | SYSTOLIC BLOOD PRESSURE: 105 MMHG

## 2019-04-21 VITALS
RESPIRATION RATE: 20 BRPM | HEART RATE: 69 BPM | TEMPERATURE: 98 F | DIASTOLIC BLOOD PRESSURE: 85 MMHG | HEIGHT: 68 IN | OXYGEN SATURATION: 99 % | WEIGHT: 190.04 LBS | SYSTOLIC BLOOD PRESSURE: 124 MMHG

## 2019-04-21 DIAGNOSIS — Z90.49 ACQUIRED ABSENCE OF OTHER SPECIFIED PARTS OF DIGESTIVE TRACT: Chronic | ICD-10-CM

## 2019-04-21 PROBLEM — J45.909 UNSPECIFIED ASTHMA, UNCOMPLICATED: Chronic | Status: ACTIVE | Noted: 2018-08-14

## 2019-04-21 PROCEDURE — 96374 THER/PROPH/DIAG INJ IV PUSH: CPT

## 2019-04-21 PROCEDURE — 99284 EMERGENCY DEPT VISIT MOD MDM: CPT

## 2019-04-21 PROCEDURE — 99284 EMERGENCY DEPT VISIT MOD MDM: CPT | Mod: 25

## 2019-04-21 PROCEDURE — 96375 TX/PRO/DX INJ NEW DRUG ADDON: CPT

## 2019-04-21 RX ORDER — ONDANSETRON 8 MG/1
4 TABLET, FILM COATED ORAL ONCE
Qty: 0 | Refills: 0 | Status: COMPLETED | OUTPATIENT
Start: 2019-04-21 | End: 2019-04-21

## 2019-04-21 RX ORDER — SODIUM CHLORIDE 9 MG/ML
1000 INJECTION INTRAMUSCULAR; INTRAVENOUS; SUBCUTANEOUS ONCE
Qty: 0 | Refills: 0 | Status: COMPLETED | OUTPATIENT
Start: 2019-04-21 | End: 2019-04-21

## 2019-04-21 RX ORDER — FAMOTIDINE 10 MG/ML
20 INJECTION INTRAVENOUS ONCE
Qty: 0 | Refills: 0 | Status: COMPLETED | OUTPATIENT
Start: 2019-04-21 | End: 2019-04-21

## 2019-04-21 RX ORDER — ONDANSETRON 8 MG/1
1 TABLET, FILM COATED ORAL
Qty: 8 | Refills: 0 | OUTPATIENT
Start: 2019-04-21 | End: 2019-04-22

## 2019-04-21 RX ORDER — MECLIZINE HCL 12.5 MG
1 TABLET ORAL
Qty: 4 | Refills: 0 | OUTPATIENT
Start: 2019-04-21 | End: 2019-04-22

## 2019-04-21 RX ORDER — MECLIZINE HCL 12.5 MG
25 TABLET ORAL ONCE
Qty: 0 | Refills: 0 | Status: COMPLETED | OUTPATIENT
Start: 2019-04-21 | End: 2019-04-21

## 2019-04-21 RX ADMIN — Medication 25 MILLIGRAM(S): at 14:24

## 2019-04-21 RX ADMIN — FAMOTIDINE 20 MILLIGRAM(S): 10 INJECTION INTRAVENOUS at 14:24

## 2019-04-21 RX ADMIN — ONDANSETRON 4 MILLIGRAM(S): 8 TABLET, FILM COATED ORAL at 14:24

## 2019-04-21 RX ADMIN — SODIUM CHLORIDE 2000 MILLILITER(S): 9 INJECTION INTRAMUSCULAR; INTRAVENOUS; SUBCUTANEOUS at 14:24

## 2019-04-21 NOTE — ED PROVIDER NOTE - NSFOLLOWUPINSTRUCTIONS_ED_ALL_ED_FT
1) Follow up with your doctor this week  2) Return to the ED immediately for new or worsening symptoms   3) Medications were sent to your pharmacy, please pick them up and take as directed  4) Please attempt to stay well hydrated as this may help prevent additional episodes of vertigo

## 2019-04-21 NOTE — ED PROVIDER NOTE - ATTENDING CONTRIBUTION TO CARE
****ATTENDING**** 37yo male hx asthma bib family for dizziness and nausea. States he woke up yesterday with symptoms. + room spinning with movement and n/v. denies any ha, change in vision, weakness. no recent illness. no manipulation.  on exam, Patient is awake,alert,oriented x 3. Patient is well appearing and in no acute distress. Patient's chest is clear to ausculation, +s1s2. Abdomen is soft nd/nt +BS. Extremity with no swelling or calf tenderness. cn2-12 intact 5/5 UE/LE nml sensation.  Ddx BPPV, antivert and antiemetics and IVF.

## 2019-04-21 NOTE — ED ADULT NURSE NOTE - OBJECTIVE STATEMENT
37 y/o male patient, present to ED for dizziness, nausea and vomiting, pt report waking yesterday with acute dizziness which worsen with movement, nausea and vomiting, pt stay in bed all day, decreased PO, today he had same symptoms, on exam pt A&Ox3, PERRL, neg facial droop, neg slurred speech, neg arm drift, equal b/l extremity strength, no vision changes, report nausea, epigastric discomfort, abd soft, ND, NT, denies numbness, tingling, seen and eval by twyla CHARLES, labs drawn and sent

## 2019-04-21 NOTE — ED PROVIDER NOTE - CLINICAL SUMMARY MEDICAL DECISION MAKING FREE TEXT BOX
37 y/o M w/ PMH presenting w/ dizziness and nausea. Symptoms and exam consistent with benign positional vertigo. Low suspicion for intracranial pathology given normal neuro exam and pt being young and health otherwise. Will treat w/ meclizine, zofran, and IVF. Reassess.

## 2019-04-21 NOTE — ED PROVIDER NOTE - OBJECTIVE STATEMENT
37 y/o M w/ PMH of asthma presenting w/ dizziness and nausea. Pt presents with girlfriend. Pt reports yesterday when he woke up he was dizzy and felt as if the room was spinning. Change in position made the symptoms worse. He became nauseous after eating and vomited, which has happened multiple times since symptoms originally started. Woke up again today with similar symptoms. Pt was concerned and decided to come to the ED for further evaluation. Triage note reports abdominal pain but pt denies abd pain at this time. No recent illnesses. Denied this ever happening before. No additional complaints.

## 2019-04-21 NOTE — ED PROVIDER NOTE - PROGRESS NOTE DETAILS
Dr. García Restrepo, PGY-1: pt reports improvement of his symptoms. tolerating PO w/o nausea or vomiting. Return precautions provided and pt verbalized understanding. Ready for DC at this time. Patient is reassessed, states feeling much better at this time. Able to sit up, tolerate PO and ambulatory. RG

## 2019-04-21 NOTE — ED PROVIDER NOTE - NS ED ROS FT
GENERAL: No fever or chills  EYES: No change in vision  HEENT: No trouble swallowing or speaking  CARDIAC: No chest pain  PULMONARY: No cough or SOB  GI: No abdominal pain, +nausea and vomiting, no diarrhea or constipation  : No changes in urination  SKIN: No rashes  NEURO: +dizziness, No headache, no numbness  MSK: No joint pain  Otherwise as HPI or negative.

## 2019-04-21 NOTE — ED PROVIDER NOTE - PHYSICAL EXAMINATION
Gen: NAD, AOx3, able to make needs known, non-toxic  Head: NCAT  HEENT: EOMI, oral mucosa moist, normal conjunctiva  Lung: CTAB, no respiratory distress, no wheezes/rhonchi/rales B/L, speaking in full sentences  CV: RRR, no murmurs  Abd: soft, NTND, no guarding, no CVA tenderness  MSK: no visible deformities  Neuro: No focal sensory or motor deficits  Skin: Warm, well perfused  Psych: normal affect Gen: NAD, AOx3, able to make needs known, non-toxic  Head: NCAT  HEENT: EOMI, leftward beating nystagmus on rightward gaze, oral mucosa moist, normal conjunctiva  Lung: CTAB, no respiratory distress, no wheezes/rhonchi/rales B/L, speaking in full sentences  CV: RRR, no murmurs  Abd: soft, NTND, no guarding, no CVA tenderness  MSK: no visible deformities  Neuro: No focal sensory or motor deficits. Dizziness reproduced with positional changes during exam  Skin: Warm, well perfused  Psych: normal affect

## 2019-10-24 ENCOUNTER — TRANSCRIPTION ENCOUNTER (OUTPATIENT)
Age: 39
End: 2019-10-24

## 2021-02-05 NOTE — ED ADULT NURSE NOTE - CAS TRG GEN SKIN CONDITION
Warm Rotation Flap Text: The defect edges were debeveled with a #15 scalpel blade.  Given the location of the defect, shape of the defect and the proximity to free margins a rotation flap was deemed most appropriate.  Using a sterile surgical marker, an appropriate rotation flap was drawn incorporating the defect and placing the expected incisions within the relaxed skin tension lines where possible.    The area thus outlined was incised deep to adipose tissue with a #15 scalpel blade.  The skin margins were undermined to an appropriate distance in all directions utilizing iris scissors.

## 2024-05-01 NOTE — ED ADULT TRIAGE NOTE - HEIGHT IN CM
Database initiated pharmacy and medications verified with the patient. She is A&O comes in from home with son. She uses a cane and is RA at baseline.    172.72

## 2025-04-30 ENCOUNTER — NON-APPOINTMENT (OUTPATIENT)
Age: 45
End: 2025-04-30

## 2025-07-17 ENCOUNTER — INPATIENT (INPATIENT)
Facility: HOSPITAL | Age: 45
LOS: 1 days | Discharge: HOME | DRG: 694 | End: 2025-07-19
Attending: STUDENT IN AN ORGANIZED HEALTH CARE EDUCATION/TRAINING PROGRAM | Admitting: STUDENT IN AN ORGANIZED HEALTH CARE EDUCATION/TRAINING PROGRAM
Payer: MEDICAID

## 2025-07-17 VITALS
SYSTOLIC BLOOD PRESSURE: 148 MMHG | TEMPERATURE: 98 F | HEART RATE: 57 BPM | WEIGHT: 199.96 LBS | RESPIRATION RATE: 19 BRPM | OXYGEN SATURATION: 97 % | DIASTOLIC BLOOD PRESSURE: 74 MMHG

## 2025-07-17 DIAGNOSIS — N20.0 CALCULUS OF KIDNEY: ICD-10-CM

## 2025-07-17 DIAGNOSIS — N17.9 ACUTE KIDNEY FAILURE, UNSPECIFIED: ICD-10-CM

## 2025-07-17 DIAGNOSIS — Z90.49 ACQUIRED ABSENCE OF OTHER SPECIFIED PARTS OF DIGESTIVE TRACT: Chronic | ICD-10-CM

## 2025-07-17 DIAGNOSIS — Z29.9 ENCOUNTER FOR PROPHYLACTIC MEASURES, UNSPECIFIED: ICD-10-CM

## 2025-07-17 LAB
ALBUMIN SERPL ELPH-MCNC: 4.1 G/DL — SIGNIFICANT CHANGE UP (ref 3.5–5)
ALP SERPL-CCNC: 49 U/L — SIGNIFICANT CHANGE UP (ref 40–120)
ALT FLD-CCNC: 51 U/L DA — SIGNIFICANT CHANGE UP (ref 10–60)
ANION GAP SERPL CALC-SCNC: 6 MMOL/L — SIGNIFICANT CHANGE UP (ref 5–17)
APPEARANCE UR: ABNORMAL
AST SERPL-CCNC: 28 U/L — SIGNIFICANT CHANGE UP (ref 10–40)
BACTERIA # UR AUTO: ABNORMAL /HPF
BASOPHILS # BLD AUTO: 0.04 K/UL — SIGNIFICANT CHANGE UP (ref 0–0.2)
BASOPHILS NFR BLD AUTO: 0.6 % — SIGNIFICANT CHANGE UP (ref 0–2)
BILIRUB SERPL-MCNC: 0.7 MG/DL — SIGNIFICANT CHANGE UP (ref 0.2–1.2)
BILIRUB UR-MCNC: NEGATIVE — SIGNIFICANT CHANGE UP
BUN SERPL-MCNC: 14 MG/DL — SIGNIFICANT CHANGE UP (ref 7–18)
CALCIUM SERPL-MCNC: 8.8 MG/DL — SIGNIFICANT CHANGE UP (ref 8.4–10.5)
CHLORIDE SERPL-SCNC: 105 MMOL/L — SIGNIFICANT CHANGE UP (ref 96–108)
CO2 SERPL-SCNC: 27 MMOL/L — SIGNIFICANT CHANGE UP (ref 22–31)
COLOR SPEC: YELLOW — SIGNIFICANT CHANGE UP
COMMENT - URINE 2: SIGNIFICANT CHANGE UP
COMMENT - URINE: SIGNIFICANT CHANGE UP
CREAT SERPL-MCNC: 1.48 MG/DL — HIGH (ref 0.5–1.3)
DIFF PNL FLD: NEGATIVE — SIGNIFICANT CHANGE UP
EGFR: 59 ML/MIN/1.73M2 — LOW
EGFR: 59 ML/MIN/1.73M2 — LOW
EOSINOPHIL # BLD AUTO: 0.14 K/UL — SIGNIFICANT CHANGE UP (ref 0–0.5)
EOSINOPHIL NFR BLD AUTO: 2.1 % — SIGNIFICANT CHANGE UP (ref 0–6)
EPI CELLS # UR: PRESENT
GLUCOSE SERPL-MCNC: 128 MG/DL — HIGH (ref 70–99)
GLUCOSE UR QL: NEGATIVE MG/DL — SIGNIFICANT CHANGE UP
HCT VFR BLD CALC: 42.3 % — SIGNIFICANT CHANGE UP (ref 39–50)
HCV AB S/CO SERPL IA: 0.14 S/CO — SIGNIFICANT CHANGE UP (ref 0–0.79)
HCV AB SERPL-IMP: SIGNIFICANT CHANGE UP
HGB BLD-MCNC: 14.4 G/DL — SIGNIFICANT CHANGE UP (ref 13–17)
HIV 1 & 2 AB SERPL IA.RAPID: SIGNIFICANT CHANGE UP
IMM GRANULOCYTES NFR BLD AUTO: 0.1 % — SIGNIFICANT CHANGE UP (ref 0–0.9)
KETONES UR QL: ABNORMAL MG/DL
LEUKOCYTE ESTERASE UR-ACNC: NEGATIVE — SIGNIFICANT CHANGE UP
LYMPHOCYTES # BLD AUTO: 2.28 K/UL — SIGNIFICANT CHANGE UP (ref 1–3.3)
LYMPHOCYTES # BLD AUTO: 33.9 % — SIGNIFICANT CHANGE UP (ref 13–44)
MAGNESIUM SERPL-MCNC: 1.9 MG/DL — SIGNIFICANT CHANGE UP (ref 1.6–2.6)
MCHC RBC-ENTMCNC: 29.3 PG — SIGNIFICANT CHANGE UP (ref 27–34)
MCHC RBC-ENTMCNC: 34 G/DL — SIGNIFICANT CHANGE UP (ref 32–36)
MCV RBC AUTO: 86 FL — SIGNIFICANT CHANGE UP (ref 80–100)
MONOCYTES # BLD AUTO: 0.57 K/UL — SIGNIFICANT CHANGE UP (ref 0–0.9)
MONOCYTES NFR BLD AUTO: 8.5 % — SIGNIFICANT CHANGE UP (ref 2–14)
NEUTROPHILS # BLD AUTO: 3.68 K/UL — SIGNIFICANT CHANGE UP (ref 1.8–7.4)
NEUTROPHILS NFR BLD AUTO: 54.8 % — SIGNIFICANT CHANGE UP (ref 43–77)
NITRITE UR-MCNC: NEGATIVE — SIGNIFICANT CHANGE UP
NRBC BLD AUTO-RTO: 0 /100 WBCS — SIGNIFICANT CHANGE UP (ref 0–0)
PH UR: 5 — SIGNIFICANT CHANGE UP (ref 5–8)
PHOSPHATE SERPL-MCNC: 2.5 MG/DL — SIGNIFICANT CHANGE UP (ref 2.5–4.5)
PLATELET # BLD AUTO: 238 K/UL — SIGNIFICANT CHANGE UP (ref 150–400)
POTASSIUM SERPL-MCNC: 3.7 MMOL/L — SIGNIFICANT CHANGE UP (ref 3.5–5.3)
POTASSIUM SERPL-SCNC: 3.7 MMOL/L — SIGNIFICANT CHANGE UP (ref 3.5–5.3)
PROT SERPL-MCNC: 7.5 G/DL — SIGNIFICANT CHANGE UP (ref 6–8.3)
PROT UR-MCNC: ABNORMAL MG/DL
RBC # BLD: 4.92 M/UL — SIGNIFICANT CHANGE UP (ref 4.2–5.8)
RBC # FLD: 12.2 % — SIGNIFICANT CHANGE UP (ref 10.3–14.5)
RBC CASTS # UR COMP ASSIST: 1 /HPF — SIGNIFICANT CHANGE UP (ref 0–4)
SODIUM SERPL-SCNC: 138 MMOL/L — SIGNIFICANT CHANGE UP (ref 135–145)
SP GR SPEC: 1.03 — HIGH (ref 1–1.03)
UROBILINOGEN FLD QL: 1 MG/DL — SIGNIFICANT CHANGE UP (ref 0.2–1)
WBC # BLD: 6.72 K/UL — SIGNIFICANT CHANGE UP (ref 3.8–10.5)
WBC # FLD AUTO: 6.72 K/UL — SIGNIFICANT CHANGE UP (ref 3.8–10.5)
WBC UR QL: 2 /HPF — SIGNIFICANT CHANGE UP (ref 0–5)

## 2025-07-17 PROCEDURE — 99223 1ST HOSP IP/OBS HIGH 75: CPT

## 2025-07-17 PROCEDURE — 93010 ELECTROCARDIOGRAM REPORT: CPT

## 2025-07-17 PROCEDURE — 99223 1ST HOSP IP/OBS HIGH 75: CPT | Mod: GC

## 2025-07-17 PROCEDURE — 74177 CT ABD & PELVIS W/CONTRAST: CPT

## 2025-07-17 PROCEDURE — 74177 CT ABD & PELVIS W/CONTRAST: CPT | Mod: 26

## 2025-07-17 PROCEDURE — 36415 COLL VENOUS BLD VENIPUNCTURE: CPT

## 2025-07-17 PROCEDURE — 80053 COMPREHEN METABOLIC PANEL: CPT

## 2025-07-17 PROCEDURE — 83735 ASSAY OF MAGNESIUM: CPT

## 2025-07-17 PROCEDURE — 85025 COMPLETE CBC W/AUTO DIFF WBC: CPT

## 2025-07-17 PROCEDURE — 99285 EMERGENCY DEPT VISIT HI MDM: CPT

## 2025-07-17 PROCEDURE — 86803 HEPATITIS C AB TEST: CPT

## 2025-07-17 PROCEDURE — 81001 URINALYSIS AUTO W/SCOPE: CPT

## 2025-07-17 PROCEDURE — 86703 HIV-1/HIV-2 1 RESULT ANTBDY: CPT

## 2025-07-17 PROCEDURE — 84100 ASSAY OF PHOSPHORUS: CPT

## 2025-07-17 PROCEDURE — 87086 URINE CULTURE/COLONY COUNT: CPT

## 2025-07-17 RX ORDER — NALOXONE HYDROCHLORIDE 0.4 MG/ML
0.4 INJECTION, SOLUTION INTRAMUSCULAR; INTRAVENOUS; SUBCUTANEOUS ONCE
Refills: 0 | Status: DISCONTINUED | OUTPATIENT
Start: 2025-07-17 | End: 2025-07-18

## 2025-07-17 RX ORDER — TAMSULOSIN HYDROCHLORIDE 0.4 MG/1
1 CAPSULE ORAL
Qty: 30 | Refills: 0
Start: 2025-07-17 | End: 2025-08-15

## 2025-07-17 RX ORDER — KETOROLAC TROMETHAMINE 30 MG/ML
15 INJECTION, SOLUTION INTRAMUSCULAR; INTRAVENOUS ONCE
Refills: 0 | Status: DISCONTINUED | OUTPATIENT
Start: 2025-07-17 | End: 2025-07-17

## 2025-07-17 RX ORDER — MAGNESIUM, ALUMINUM HYDROXIDE 200-200 MG
30 TABLET,CHEWABLE ORAL EVERY 4 HOURS
Refills: 0 | Status: DISCONTINUED | OUTPATIENT
Start: 2025-07-17 | End: 2025-07-19

## 2025-07-17 RX ORDER — ONDANSETRON HCL/PF 4 MG/2 ML
4 VIAL (ML) INJECTION EVERY 8 HOURS
Refills: 0 | Status: DISCONTINUED | OUTPATIENT
Start: 2025-07-17 | End: 2025-07-19

## 2025-07-17 RX ORDER — ACETAMINOPHEN 500 MG/5ML
1000 LIQUID (ML) ORAL EVERY 8 HOURS
Refills: 0 | Status: DISCONTINUED | OUTPATIENT
Start: 2025-07-17 | End: 2025-07-19

## 2025-07-17 RX ORDER — ACETAMINOPHEN 500 MG/5ML
650 LIQUID (ML) ORAL EVERY 6 HOURS
Refills: 0 | Status: DISCONTINUED | OUTPATIENT
Start: 2025-07-17 | End: 2025-07-17

## 2025-07-17 RX ORDER — TAMSULOSIN HYDROCHLORIDE 0.4 MG/1
0.4 CAPSULE ORAL ONCE
Refills: 0 | Status: COMPLETED | OUTPATIENT
Start: 2025-07-17 | End: 2025-07-17

## 2025-07-17 RX ORDER — SODIUM CHLORIDE 9 G/1000ML
1000 INJECTION, SOLUTION INTRAVENOUS ONCE
Refills: 0 | Status: COMPLETED | OUTPATIENT
Start: 2025-07-17 | End: 2025-07-17

## 2025-07-17 RX ORDER — ONDANSETRON HCL/PF 4 MG/2 ML
4 VIAL (ML) INJECTION ONCE
Refills: 0 | Status: COMPLETED | OUTPATIENT
Start: 2025-07-17 | End: 2025-07-17

## 2025-07-17 RX ORDER — BISACODYL 5 MG
5 TABLET, DELAYED RELEASE (ENTERIC COATED) ORAL DAILY
Refills: 0 | Status: DISCONTINUED | OUTPATIENT
Start: 2025-07-17 | End: 2025-07-19

## 2025-07-17 RX ORDER — ONDANSETRON HCL/PF 4 MG/2 ML
1 VIAL (ML) INJECTION
Qty: 1 | Refills: 0
Start: 2025-07-17 | End: 2025-07-19

## 2025-07-17 RX ORDER — POLYETHYLENE GLYCOL 3350 17 G/17G
17 POWDER, FOR SOLUTION ORAL DAILY
Refills: 0 | Status: DISCONTINUED | OUTPATIENT
Start: 2025-07-17 | End: 2025-07-18

## 2025-07-17 RX ORDER — SODIUM CHLORIDE 9 G/1000ML
1000 INJECTION, SOLUTION INTRAVENOUS
Refills: 0 | Status: DISCONTINUED | OUTPATIENT
Start: 2025-07-17 | End: 2025-07-18

## 2025-07-17 RX ORDER — ACETAMINOPHEN 500 MG/5ML
1000 LIQUID (ML) ORAL ONCE
Refills: 0 | Status: COMPLETED | OUTPATIENT
Start: 2025-07-17 | End: 2025-07-17

## 2025-07-17 RX ORDER — SENNA 187 MG
2 TABLET ORAL AT BEDTIME
Refills: 0 | Status: DISCONTINUED | OUTPATIENT
Start: 2025-07-17 | End: 2025-07-18

## 2025-07-17 RX ORDER — MELATONIN 5 MG
3 TABLET ORAL AT BEDTIME
Refills: 0 | Status: DISCONTINUED | OUTPATIENT
Start: 2025-07-17 | End: 2025-07-19

## 2025-07-17 RX ORDER — OXYCODONE HYDROCHLORIDE 30 MG/1
1 TABLET ORAL
Qty: 9 | Refills: 0
Start: 2025-07-17 | End: 2025-07-19

## 2025-07-17 RX ADMIN — SODIUM CHLORIDE 1000 MILLILITER(S): 9 INJECTION, SOLUTION INTRAVENOUS at 09:30

## 2025-07-17 RX ADMIN — SODIUM CHLORIDE 100 MILLILITER(S): 9 INJECTION, SOLUTION INTRAVENOUS at 17:42

## 2025-07-17 RX ADMIN — Medication 1000 MILLILITER(S): at 13:57

## 2025-07-17 RX ADMIN — SODIUM CHLORIDE 1000 MILLILITER(S): 9 INJECTION, SOLUTION INTRAVENOUS at 08:30

## 2025-07-17 RX ADMIN — Medication 1000 MILLILITER(S): at 15:00

## 2025-07-17 RX ADMIN — Medication 4 MILLIGRAM(S): at 09:42

## 2025-07-17 RX ADMIN — Medication 2 TABLET(S): at 21:41

## 2025-07-17 RX ADMIN — Medication 1000 MILLILITER(S): at 10:48

## 2025-07-17 RX ADMIN — KETOROLAC TROMETHAMINE 15 MILLIGRAM(S): 30 INJECTION, SOLUTION INTRAMUSCULAR; INTRAVENOUS at 08:30

## 2025-07-17 RX ADMIN — KETOROLAC TROMETHAMINE 15 MILLIGRAM(S): 30 INJECTION, SOLUTION INTRAMUSCULAR; INTRAVENOUS at 13:59

## 2025-07-17 RX ADMIN — Medication 400 MILLIGRAM(S): at 09:06

## 2025-07-17 RX ADMIN — KETOROLAC TROMETHAMINE 15 MILLIGRAM(S): 30 INJECTION, SOLUTION INTRAMUSCULAR; INTRAVENOUS at 09:30

## 2025-07-17 RX ADMIN — Medication 1000 MILLIGRAM(S): at 09:21

## 2025-07-17 RX ADMIN — Medication 1000 MILLIGRAM(S): at 23:05

## 2025-07-17 RX ADMIN — KETOROLAC TROMETHAMINE 15 MILLIGRAM(S): 30 INJECTION, SOLUTION INTRAMUSCULAR; INTRAVENOUS at 15:00

## 2025-07-17 RX ADMIN — Medication 1000 MILLIGRAM(S): at 10:06

## 2025-07-17 RX ADMIN — Medication 4 MILLIGRAM(S): at 10:42

## 2025-07-17 RX ADMIN — TAMSULOSIN HYDROCHLORIDE 0.4 MILLIGRAM(S): 0.4 CAPSULE ORAL at 14:16

## 2025-07-17 RX ADMIN — Medication 1000 MILLILITER(S): at 11:48

## 2025-07-17 RX ADMIN — Medication 4 MILLIGRAM(S): at 08:16

## 2025-07-17 RX ADMIN — SODIUM CHLORIDE 100 MILLILITER(S): 9 INJECTION, SOLUTION INTRAVENOUS at 16:38

## 2025-07-17 RX ADMIN — Medication 1000 MILLIGRAM(S): at 21:42

## 2025-07-17 RX ADMIN — Medication 4 MILLIGRAM(S): at 13:58

## 2025-07-17 RX ADMIN — Medication 4 MILLIGRAM(S): at 09:16

## 2025-07-18 ENCOUNTER — APPOINTMENT (OUTPATIENT)
Dept: UROLOGY | Facility: CLINIC | Age: 45
End: 2025-07-18

## 2025-07-18 ENCOUNTER — TRANSCRIPTION ENCOUNTER (OUTPATIENT)
Age: 45
End: 2025-07-18

## 2025-07-18 LAB
ALBUMIN SERPL ELPH-MCNC: 3.1 G/DL — LOW (ref 3.5–5)
ALP SERPL-CCNC: 43 U/L — SIGNIFICANT CHANGE UP (ref 40–120)
ALT FLD-CCNC: 44 U/L DA — SIGNIFICANT CHANGE UP (ref 10–60)
ANION GAP SERPL CALC-SCNC: 2 MMOL/L — LOW (ref 5–17)
ANION GAP SERPL CALC-SCNC: 2 MMOL/L — LOW (ref 5–17)
APTT BLD: 28.6 SEC — SIGNIFICANT CHANGE UP (ref 26.1–36.8)
AST SERPL-CCNC: 34 U/L — SIGNIFICANT CHANGE UP (ref 10–40)
BILIRUB SERPL-MCNC: 0.9 MG/DL — SIGNIFICANT CHANGE UP (ref 0.2–1.2)
BLD GP AB SCN SERPL QL: SIGNIFICANT CHANGE UP
BUN SERPL-MCNC: 14 MG/DL — SIGNIFICANT CHANGE UP (ref 7–18)
BUN SERPL-MCNC: 14 MG/DL — SIGNIFICANT CHANGE UP (ref 7–18)
CALCIUM SERPL-MCNC: 7.9 MG/DL — LOW (ref 8.4–10.5)
CALCIUM SERPL-MCNC: 8.4 MG/DL — SIGNIFICANT CHANGE UP (ref 8.4–10.5)
CHLORIDE SERPL-SCNC: 108 MMOL/L — SIGNIFICANT CHANGE UP (ref 96–108)
CHLORIDE SERPL-SCNC: 109 MMOL/L — HIGH (ref 96–108)
CO2 SERPL-SCNC: 26 MMOL/L — SIGNIFICANT CHANGE UP (ref 22–31)
CO2 SERPL-SCNC: 28 MMOL/L — SIGNIFICANT CHANGE UP (ref 22–31)
CREAT SERPL-MCNC: 1.83 MG/DL — HIGH (ref 0.5–1.3)
CREAT SERPL-MCNC: 1.87 MG/DL — HIGH (ref 0.5–1.3)
CULTURE RESULTS: SIGNIFICANT CHANGE UP
EGFR: 45 ML/MIN/1.73M2 — LOW
EGFR: 45 ML/MIN/1.73M2 — LOW
EGFR: 46 ML/MIN/1.73M2 — LOW
EGFR: 46 ML/MIN/1.73M2 — LOW
GLUCOSE SERPL-MCNC: 105 MG/DL — HIGH (ref 70–99)
GLUCOSE SERPL-MCNC: 83 MG/DL — SIGNIFICANT CHANGE UP (ref 70–99)
HCT VFR BLD CALC: 36 % — LOW (ref 39–50)
HGB BLD-MCNC: 12.4 G/DL — LOW (ref 13–17)
INR BLD: 1.03 RATIO — SIGNIFICANT CHANGE UP (ref 0.85–1.16)
MAGNESIUM SERPL-MCNC: 1.9 MG/DL — SIGNIFICANT CHANGE UP (ref 1.6–2.6)
MCHC RBC-ENTMCNC: 29.6 PG — SIGNIFICANT CHANGE UP (ref 27–34)
MCHC RBC-ENTMCNC: 34.4 G/DL — SIGNIFICANT CHANGE UP (ref 32–36)
MCV RBC AUTO: 85.9 FL — SIGNIFICANT CHANGE UP (ref 80–100)
NRBC BLD AUTO-RTO: 0 /100 WBCS — SIGNIFICANT CHANGE UP (ref 0–0)
PHOSPHATE SERPL-MCNC: 2.7 MG/DL — SIGNIFICANT CHANGE UP (ref 2.5–4.5)
PLATELET # BLD AUTO: 179 K/UL — SIGNIFICANT CHANGE UP (ref 150–400)
POTASSIUM SERPL-MCNC: 3.8 MMOL/L — SIGNIFICANT CHANGE UP (ref 3.5–5.3)
POTASSIUM SERPL-MCNC: 3.9 MMOL/L — SIGNIFICANT CHANGE UP (ref 3.5–5.3)
POTASSIUM SERPL-SCNC: 3.8 MMOL/L — SIGNIFICANT CHANGE UP (ref 3.5–5.3)
POTASSIUM SERPL-SCNC: 3.9 MMOL/L — SIGNIFICANT CHANGE UP (ref 3.5–5.3)
PROT SERPL-MCNC: 5.9 G/DL — LOW (ref 6–8.3)
PROTHROM AB SERPL-ACNC: 12.1 SEC — SIGNIFICANT CHANGE UP (ref 9.9–13.4)
RBC # BLD: 4.19 M/UL — LOW (ref 4.2–5.8)
RBC # FLD: 12.5 % — SIGNIFICANT CHANGE UP (ref 10.3–14.5)
SODIUM SERPL-SCNC: 136 MMOL/L — SIGNIFICANT CHANGE UP (ref 135–145)
SODIUM SERPL-SCNC: 139 MMOL/L — SIGNIFICANT CHANGE UP (ref 135–145)
SPECIMEN SOURCE: SIGNIFICANT CHANGE UP
WBC # BLD: 10.38 K/UL — SIGNIFICANT CHANGE UP (ref 3.8–10.5)
WBC # FLD AUTO: 10.38 K/UL — SIGNIFICANT CHANGE UP (ref 3.8–10.5)

## 2025-07-18 PROCEDURE — 99232 SBSQ HOSP IP/OBS MODERATE 35: CPT | Mod: GC

## 2025-07-18 PROCEDURE — 99232 SBSQ HOSP IP/OBS MODERATE 35: CPT

## 2025-07-18 PROCEDURE — 93005 ELECTROCARDIOGRAM TRACING: CPT

## 2025-07-18 PROCEDURE — 81001 URINALYSIS AUTO W/SCOPE: CPT

## 2025-07-18 PROCEDURE — 80048 BASIC METABOLIC PNL TOTAL CA: CPT

## 2025-07-18 PROCEDURE — 83735 ASSAY OF MAGNESIUM: CPT

## 2025-07-18 PROCEDURE — 86703 HIV-1/HIV-2 1 RESULT ANTBDY: CPT

## 2025-07-18 PROCEDURE — 85610 PROTHROMBIN TIME: CPT

## 2025-07-18 PROCEDURE — 86803 HEPATITIS C AB TEST: CPT

## 2025-07-18 PROCEDURE — 87086 URINE CULTURE/COLONY COUNT: CPT

## 2025-07-18 PROCEDURE — 74177 CT ABD & PELVIS W/CONTRAST: CPT

## 2025-07-18 PROCEDURE — 84100 ASSAY OF PHOSPHORUS: CPT

## 2025-07-18 PROCEDURE — 86900 BLOOD TYPING SEROLOGIC ABO: CPT

## 2025-07-18 PROCEDURE — 86850 RBC ANTIBODY SCREEN: CPT

## 2025-07-18 PROCEDURE — 85025 COMPLETE CBC W/AUTO DIFF WBC: CPT

## 2025-07-18 PROCEDURE — 86901 BLOOD TYPING SEROLOGIC RH(D): CPT

## 2025-07-18 PROCEDURE — 85730 THROMBOPLASTIN TIME PARTIAL: CPT

## 2025-07-18 PROCEDURE — 36415 COLL VENOUS BLD VENIPUNCTURE: CPT

## 2025-07-18 PROCEDURE — 85027 COMPLETE CBC AUTOMATED: CPT

## 2025-07-18 PROCEDURE — 80053 COMPREHEN METABOLIC PANEL: CPT

## 2025-07-18 RX ORDER — SODIUM CHLORIDE 9 G/1000ML
1000 INJECTION, SOLUTION INTRAVENOUS ONCE
Refills: 0 | Status: COMPLETED | OUTPATIENT
Start: 2025-07-18 | End: 2025-07-18

## 2025-07-18 RX ORDER — ACETAMINOPHEN 500 MG/5ML
1000 LIQUID (ML) ORAL ONCE
Refills: 0 | Status: COMPLETED | OUTPATIENT
Start: 2025-07-18 | End: 2025-07-18

## 2025-07-18 RX ORDER — SODIUM CHLORIDE 9 G/1000ML
1000 INJECTION, SOLUTION INTRAVENOUS
Refills: 0 | Status: DISCONTINUED | OUTPATIENT
Start: 2025-07-18 | End: 2025-07-19

## 2025-07-18 RX ADMIN — Medication 400 MILLIGRAM(S): at 09:34

## 2025-07-18 RX ADMIN — SODIUM CHLORIDE 1000 MILLILITER(S): 9 INJECTION, SOLUTION INTRAVENOUS at 14:26

## 2025-07-18 RX ADMIN — Medication 1000 MILLIGRAM(S): at 21:15

## 2025-07-18 RX ADMIN — Medication 3 MILLIGRAM(S): at 21:15

## 2025-07-18 RX ADMIN — Medication 1000 MILLIGRAM(S): at 14:57

## 2025-07-18 RX ADMIN — Medication 1000 MILLIGRAM(S): at 20:21

## 2025-07-18 RX ADMIN — SODIUM CHLORIDE 100 MILLILITER(S): 9 INJECTION, SOLUTION INTRAVENOUS at 17:56

## 2025-07-18 RX ADMIN — Medication 1000 MILLIGRAM(S): at 22:58

## 2025-07-18 RX ADMIN — Medication 1000 MILLIGRAM(S): at 10:34

## 2025-07-18 RX ADMIN — Medication 1000 MILLIGRAM(S): at 06:39

## 2025-07-18 RX ADMIN — SODIUM CHLORIDE 100 MILLILITER(S): 9 INJECTION, SOLUTION INTRAVENOUS at 21:30

## 2025-07-18 RX ADMIN — Medication 1000 MILLIGRAM(S): at 05:22

## 2025-07-19 ENCOUNTER — TRANSCRIPTION ENCOUNTER (OUTPATIENT)
Age: 45
End: 2025-07-19

## 2025-07-19 VITALS
OXYGEN SATURATION: 96 % | SYSTOLIC BLOOD PRESSURE: 128 MMHG | TEMPERATURE: 98 F | RESPIRATION RATE: 18 BRPM | DIASTOLIC BLOOD PRESSURE: 72 MMHG | HEART RATE: 65 BPM

## 2025-07-19 LAB
ALBUMIN SERPL ELPH-MCNC: 3 G/DL — LOW (ref 3.5–5)
ALP SERPL-CCNC: 53 U/L — SIGNIFICANT CHANGE UP (ref 40–120)
ALT FLD-CCNC: 62 U/L DA — HIGH (ref 10–60)
ANION GAP SERPL CALC-SCNC: 5 MMOL/L — SIGNIFICANT CHANGE UP (ref 5–17)
AST SERPL-CCNC: 43 U/L — HIGH (ref 10–40)
BASOPHILS # BLD AUTO: 0.02 K/UL — SIGNIFICANT CHANGE UP (ref 0–0.2)
BASOPHILS NFR BLD AUTO: 0.3 % — SIGNIFICANT CHANGE UP (ref 0–2)
BILIRUB SERPL-MCNC: 0.7 MG/DL — SIGNIFICANT CHANGE UP (ref 0.2–1.2)
BUN SERPL-MCNC: 13 MG/DL — SIGNIFICANT CHANGE UP (ref 7–18)
CALCIUM SERPL-MCNC: 8.2 MG/DL — LOW (ref 8.4–10.5)
CHLORIDE SERPL-SCNC: 110 MMOL/L — HIGH (ref 96–108)
CO2 SERPL-SCNC: 26 MMOL/L — SIGNIFICANT CHANGE UP (ref 22–31)
CREAT SERPL-MCNC: 1.52 MG/DL — HIGH (ref 0.5–1.3)
EGFR: 58 ML/MIN/1.73M2 — LOW
EGFR: 58 ML/MIN/1.73M2 — LOW
EOSINOPHIL # BLD AUTO: 0.1 K/UL — SIGNIFICANT CHANGE UP (ref 0–0.5)
EOSINOPHIL NFR BLD AUTO: 1.5 % — SIGNIFICANT CHANGE UP (ref 0–6)
GLUCOSE SERPL-MCNC: 96 MG/DL — SIGNIFICANT CHANGE UP (ref 70–99)
HCT VFR BLD CALC: 34.4 % — LOW (ref 39–50)
HGB BLD-MCNC: 12 G/DL — LOW (ref 13–17)
IMM GRANULOCYTES NFR BLD AUTO: 0.1 % — SIGNIFICANT CHANGE UP (ref 0–0.9)
LYMPHOCYTES # BLD AUTO: 1.78 K/UL — SIGNIFICANT CHANGE UP (ref 1–3.3)
LYMPHOCYTES # BLD AUTO: 26.1 % — SIGNIFICANT CHANGE UP (ref 13–44)
MAGNESIUM SERPL-MCNC: 2.1 MG/DL — SIGNIFICANT CHANGE UP (ref 1.6–2.6)
MCHC RBC-ENTMCNC: 29.7 PG — SIGNIFICANT CHANGE UP (ref 27–34)
MCHC RBC-ENTMCNC: 34.9 G/DL — SIGNIFICANT CHANGE UP (ref 32–36)
MCV RBC AUTO: 85.1 FL — SIGNIFICANT CHANGE UP (ref 80–100)
MONOCYTES # BLD AUTO: 0.74 K/UL — SIGNIFICANT CHANGE UP (ref 0–0.9)
MONOCYTES NFR BLD AUTO: 10.8 % — SIGNIFICANT CHANGE UP (ref 2–14)
NEUTROPHILS # BLD AUTO: 4.18 K/UL — SIGNIFICANT CHANGE UP (ref 1.8–7.4)
NEUTROPHILS NFR BLD AUTO: 61.2 % — SIGNIFICANT CHANGE UP (ref 43–77)
NRBC BLD AUTO-RTO: 0 /100 WBCS — SIGNIFICANT CHANGE UP (ref 0–0)
PHOSPHATE SERPL-MCNC: 3.2 MG/DL — SIGNIFICANT CHANGE UP (ref 2.5–4.5)
PLATELET # BLD AUTO: 177 K/UL — SIGNIFICANT CHANGE UP (ref 150–400)
POTASSIUM SERPL-MCNC: 3.6 MMOL/L — SIGNIFICANT CHANGE UP (ref 3.5–5.3)
POTASSIUM SERPL-SCNC: 3.6 MMOL/L — SIGNIFICANT CHANGE UP (ref 3.5–5.3)
PROT SERPL-MCNC: 6.2 G/DL — SIGNIFICANT CHANGE UP (ref 6–8.3)
RBC # BLD: 4.04 M/UL — LOW (ref 4.2–5.8)
RBC # FLD: 12.5 % — SIGNIFICANT CHANGE UP (ref 10.3–14.5)
SODIUM SERPL-SCNC: 141 MMOL/L — SIGNIFICANT CHANGE UP (ref 135–145)
WBC # BLD: 6.83 K/UL — SIGNIFICANT CHANGE UP (ref 3.8–10.5)
WBC # FLD AUTO: 6.83 K/UL — SIGNIFICANT CHANGE UP (ref 3.8–10.5)

## 2025-07-19 PROCEDURE — 96365 THER/PROPH/DIAG IV INF INIT: CPT

## 2025-07-19 PROCEDURE — 85730 THROMBOPLASTIN TIME PARTIAL: CPT

## 2025-07-19 PROCEDURE — 74177 CT ABD & PELVIS W/CONTRAST: CPT

## 2025-07-19 PROCEDURE — 86850 RBC ANTIBODY SCREEN: CPT

## 2025-07-19 PROCEDURE — 83735 ASSAY OF MAGNESIUM: CPT

## 2025-07-19 PROCEDURE — 96361 HYDRATE IV INFUSION ADD-ON: CPT

## 2025-07-19 PROCEDURE — 85027 COMPLETE CBC AUTOMATED: CPT

## 2025-07-19 PROCEDURE — 99285 EMERGENCY DEPT VISIT HI MDM: CPT

## 2025-07-19 PROCEDURE — 82365 CALCULUS SPECTROSCOPY: CPT

## 2025-07-19 PROCEDURE — 86703 HIV-1/HIV-2 1 RESULT ANTBDY: CPT

## 2025-07-19 PROCEDURE — 36415 COLL VENOUS BLD VENIPUNCTURE: CPT

## 2025-07-19 PROCEDURE — 96376 TX/PRO/DX INJ SAME DRUG ADON: CPT

## 2025-07-19 PROCEDURE — 80048 BASIC METABOLIC PNL TOTAL CA: CPT

## 2025-07-19 PROCEDURE — 85025 COMPLETE CBC W/AUTO DIFF WBC: CPT

## 2025-07-19 PROCEDURE — 86803 HEPATITIS C AB TEST: CPT

## 2025-07-19 PROCEDURE — 86900 BLOOD TYPING SEROLOGIC ABO: CPT

## 2025-07-19 PROCEDURE — 93005 ELECTROCARDIOGRAM TRACING: CPT

## 2025-07-19 PROCEDURE — 96375 TX/PRO/DX INJ NEW DRUG ADDON: CPT

## 2025-07-19 PROCEDURE — 80053 COMPREHEN METABOLIC PANEL: CPT

## 2025-07-19 PROCEDURE — 86901 BLOOD TYPING SEROLOGIC RH(D): CPT

## 2025-07-19 PROCEDURE — 84100 ASSAY OF PHOSPHORUS: CPT

## 2025-07-19 PROCEDURE — 81001 URINALYSIS AUTO W/SCOPE: CPT

## 2025-07-19 PROCEDURE — 85610 PROTHROMBIN TIME: CPT

## 2025-07-19 PROCEDURE — 99239 HOSP IP/OBS DSCHRG MGMT >30: CPT

## 2025-07-19 PROCEDURE — 87086 URINE CULTURE/COLONY COUNT: CPT

## 2025-07-19 RX ORDER — SODIUM CHLORIDE 9 G/1000ML
1000 INJECTION, SOLUTION INTRAVENOUS
Refills: 0 | Status: DISCONTINUED | OUTPATIENT
Start: 2025-07-19 | End: 2025-07-19

## 2025-07-19 RX ADMIN — Medication 40 MILLIEQUIVALENT(S): at 10:03

## 2025-07-19 RX ADMIN — SODIUM CHLORIDE 100 MILLILITER(S): 9 INJECTION, SOLUTION INTRAVENOUS at 09:59

## 2025-07-29 LAB
CELL MATERIAL STONE EST-MCNT: SIGNIFICANT CHANGE UP
LABORATORY COMMENT REPORT: SIGNIFICANT CHANGE UP
NIDUS STONE QN: SIGNIFICANT CHANGE UP